# Patient Record
Sex: MALE | Race: WHITE | Employment: OTHER | URBAN - METROPOLITAN AREA
[De-identification: names, ages, dates, MRNs, and addresses within clinical notes are randomized per-mention and may not be internally consistent; named-entity substitution may affect disease eponyms.]

---

## 2021-05-03 ENCOUNTER — OFFICE VISIT (OUTPATIENT)
Dept: INTERNAL MEDICINE CLINIC | Age: 56
End: 2021-05-03
Payer: COMMERCIAL

## 2021-05-03 VITALS
DIASTOLIC BLOOD PRESSURE: 70 MMHG | HEART RATE: 85 BPM | TEMPERATURE: 98 F | BODY MASS INDEX: 29.39 KG/M2 | OXYGEN SATURATION: 99 % | WEIGHT: 217 LBS | SYSTOLIC BLOOD PRESSURE: 110 MMHG | HEIGHT: 72 IN | RESPIRATION RATE: 16 BRPM

## 2021-05-03 DIAGNOSIS — N52.2 DRUG-INDUCED ERECTILE DYSFUNCTION: ICD-10-CM

## 2021-05-03 DIAGNOSIS — Z12.5 PROSTATE CANCER SCREENING: ICD-10-CM

## 2021-05-03 DIAGNOSIS — E78.5 DYSLIPIDEMIA (HIGH LDL; LOW HDL): ICD-10-CM

## 2021-05-03 DIAGNOSIS — Z12.11 COLON CANCER SCREENING: Primary | ICD-10-CM

## 2021-05-03 DIAGNOSIS — G47.09 OTHER INSOMNIA: ICD-10-CM

## 2021-05-03 DIAGNOSIS — N20.0 RENAL STONES: ICD-10-CM

## 2021-05-03 PROCEDURE — 99203 OFFICE O/P NEW LOW 30 MIN: CPT | Performed by: INTERNAL MEDICINE

## 2021-05-03 RX ORDER — FINASTERIDE 1 MG/1
1 TABLET, FILM COATED ORAL DAILY
COMMUNITY
End: 2021-05-03 | Stop reason: SDUPTHER

## 2021-05-03 RX ORDER — ZOLPIDEM TARTRATE 6.25 MG/1
6.25 TABLET, FILM COATED, EXTENDED RELEASE ORAL
Qty: 30 TAB | Refills: 3 | Status: SHIPPED | OUTPATIENT
Start: 2021-05-03 | End: 2021-11-10

## 2021-05-03 RX ORDER — SILDENAFIL 100 MG/1
100 TABLET, FILM COATED ORAL AS NEEDED
Qty: 15 TAB | Refills: 2 | Status: SHIPPED | OUTPATIENT
Start: 2021-05-03 | End: 2021-11-10

## 2021-05-03 RX ORDER — FINASTERIDE 1 MG/1
1 TABLET, FILM COATED ORAL DAILY
Qty: 90 TAB | Refills: 1 | Status: SHIPPED | OUTPATIENT
Start: 2021-05-03 | End: 2022-08-15

## 2021-05-03 RX ORDER — POTASSIUM CITRATE 10 MEQ/1
10 TABLET, EXTENDED RELEASE ORAL DAILY
Qty: 90 TAB | Refills: 1 | Status: SHIPPED | OUTPATIENT
Start: 2021-05-03 | End: 2022-01-08

## 2021-05-03 RX ORDER — ZOLPIDEM TARTRATE 5 MG/1
TABLET ORAL
COMMUNITY
End: 2021-05-03 | Stop reason: ALTCHOICE

## 2021-05-03 RX ORDER — POTASSIUM CITRATE 10 MEQ/1
10 TABLET, EXTENDED RELEASE ORAL
COMMUNITY
End: 2021-05-03 | Stop reason: SDUPTHER

## 2021-05-03 RX ORDER — SILDENAFIL 100 MG/1
100 TABLET, FILM COATED ORAL AS NEEDED
COMMUNITY
End: 2021-05-03 | Stop reason: SDUPTHER

## 2021-05-03 NOTE — PROGRESS NOTES
1. Have you been to the ER, urgent care clinic since your last visit? Hospitalized since your last visit? No    2. Have you seen or consulted any other health care providers outside of the [address] [place] [address] since your last visit? Include any pap smears or colon screening.  No   Chief Complaint   Patient presents with   [address]

## 2021-05-04 PROBLEM — D75.1 POLYCYTHEMIA: Status: ACTIVE | Noted: 2021-05-04

## 2021-05-04 NOTE — PROGRESS NOTES
Oleg Limon is a 12-year-old white male, moved to this area in January of this year. He has a history of chronic mild hair loss and has used Propecia 1 mg for over 20 years and has prevented hair loss. He has got erectile dysfunction and takes Viagra. The erectile dysfunction may be related to the Propecia. He is due for colonoscopy. He has a history of renal stones and is on a potassium supplement to try to reduce the stone formation. He has not had a stone in more than a year, for him it is good. 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 He is generally healthy. He was getting care from the [facility] outside of [facility]. However, I have not been able to get his labs of [facility]. His blood pressure was normal.  He had regular S1, regular S2 without murmur. Abdomen soft. Neurologically, he looked well. Hair a little thin, but not bad. I went ahead and refilled the Propecia, refilled his Viagra, refilled his potassium supplement. I had asked him to get me copies of his lab work since I cannot get it off [employer] and I will order some repeat labs including a PSA screen and I have referred him for colonoscopy. Patient Active Problem List    Diagnosis    Renal stones    Drug-induced erectile dysfunction    Other insomnia     Diagnoses and all orders for this visit:    1. Colon cancer screening  -     REFERRAL TO GASTROENTEROLOGY    2. Renal stones  -     CBC WITH AUTOMATED DIFF; Future  -     LIPID PANEL; Future  -     potassium citrate (UROCIT-K10) 10 mEq (1,080 mg) TbER; Take 1 Tab by mouth daily. 3. Dyslipidemia (high LDL; low HDL)  -     METABOLIC PANEL, COMPREHENSIVE; Future  -     MAGNESIUM; Future    4. Prostate cancer screening  -     PSA SCREENING (SCREENING); Future    5. Other insomnia  -     zolpidem CR (Ambien CR) 6.25 mg tablet; Take 1 Tab by mouth nightly as needed for Sleep.  Max Daily Amount: 6.25 mg.    6. Drug-induced erectile dysfunction  -     sildenafil citrate (VIAGRA) 100 mg tablet; Take 1 Tab by mouth as needed for Erectile Dysfunction. Other orders  -     finasteride (PROPECIA) 1 mg tablet; Take 1 Tab by mouth daily.       Refill sleep ad change to time relase veronica better efficacy

## 2021-11-10 DIAGNOSIS — N52.2 DRUG-INDUCED ERECTILE DYSFUNCTION: ICD-10-CM

## 2021-11-10 DIAGNOSIS — G47.09 OTHER INSOMNIA: ICD-10-CM

## 2021-11-10 RX ORDER — SILDENAFIL 100 MG/1
TABLET, FILM COATED ORAL
Qty: 15 TABLET | Refills: 0 | Status: SHIPPED | OUTPATIENT
Start: 2021-11-10 | End: 2021-12-15

## 2021-11-10 RX ORDER — ZOLPIDEM TARTRATE 6.25 MG/1
TABLET, FILM COATED, EXTENDED RELEASE ORAL
Qty: 30 TABLET | Refills: 0 | Status: SHIPPED | OUTPATIENT
Start: 2021-11-10 | End: 2021-12-15

## 2021-12-15 DIAGNOSIS — G47.09 OTHER INSOMNIA: ICD-10-CM

## 2021-12-15 DIAGNOSIS — N52.2 DRUG-INDUCED ERECTILE DYSFUNCTION: ICD-10-CM

## 2021-12-15 RX ORDER — SILDENAFIL 100 MG/1
TABLET, FILM COATED ORAL
Qty: 15 TABLET | Refills: 0 | Status: SHIPPED | OUTPATIENT
Start: 2021-12-15 | End: 2022-01-26

## 2021-12-15 RX ORDER — ZOLPIDEM TARTRATE 6.25 MG/1
TABLET, FILM COATED, EXTENDED RELEASE ORAL
Qty: 30 TABLET | Refills: 0 | Status: SHIPPED | OUTPATIENT
Start: 2021-12-15 | End: 2022-01-26

## 2022-03-18 PROBLEM — N52.2 DRUG-INDUCED ERECTILE DYSFUNCTION: Status: ACTIVE | Noted: 2021-05-03

## 2022-03-18 PROBLEM — N20.0 RENAL STONES: Status: ACTIVE | Noted: 2021-05-03

## 2022-03-19 PROBLEM — G47.09 OTHER INSOMNIA: Status: ACTIVE | Noted: 2021-05-03

## 2022-03-19 PROBLEM — D75.1 POLYCYTHEMIA: Status: ACTIVE | Noted: 2021-05-04

## 2022-05-25 ENCOUNTER — OFFICE VISIT (OUTPATIENT)
Dept: INTERNAL MEDICINE CLINIC | Age: 57
End: 2022-05-25
Payer: COMMERCIAL

## 2022-05-25 VITALS
DIASTOLIC BLOOD PRESSURE: 88 MMHG | HEIGHT: 72 IN | WEIGHT: 215 LBS | SYSTOLIC BLOOD PRESSURE: 133 MMHG | BODY MASS INDEX: 29.12 KG/M2 | TEMPERATURE: 98.2 F | RESPIRATION RATE: 18 BRPM | OXYGEN SATURATION: 100 % | HEART RATE: 55 BPM

## 2022-05-25 DIAGNOSIS — E78.5 DYSLIPIDEMIA (HIGH LDL; LOW HDL): Primary | ICD-10-CM

## 2022-05-25 DIAGNOSIS — G47.09 OTHER INSOMNIA: ICD-10-CM

## 2022-05-25 DIAGNOSIS — D75.1 POLYCYTHEMIA: ICD-10-CM

## 2022-05-25 DIAGNOSIS — Z12.5 PROSTATE CANCER SCREENING: ICD-10-CM

## 2022-05-25 DIAGNOSIS — N52.2 DRUG-INDUCED ERECTILE DYSFUNCTION: ICD-10-CM

## 2022-05-25 PROCEDURE — 99214 OFFICE O/P EST MOD 30 MIN: CPT | Performed by: INTERNAL MEDICINE

## 2022-05-25 RX ORDER — ZOLPIDEM TARTRATE 6.25 MG/1
TABLET, FILM COATED, EXTENDED RELEASE ORAL
Qty: 30 TABLET | Refills: 1 | Status: SHIPPED | OUTPATIENT
Start: 2022-05-25 | End: 2022-10-18

## 2022-05-25 RX ORDER — SILDENAFIL 100 MG/1
TABLET, FILM COATED ORAL
Qty: 15 TABLET | Refills: 3 | Status: SHIPPED | OUTPATIENT
Start: 2022-05-25

## 2022-05-25 NOTE — PROGRESS NOTES
Chief Complaint   Patient presents with    Medication Refill       Vitals:    05/25/22 1633   BP: 133/88   Pulse: (!) 55   Resp: 18   Temp: 98.2 °F (36.8 °C)   TempSrc: Temporal   SpO2: 100%   Weight: 215 lb (97.5 kg)   Height: 6' (1.829 m)   PainSc:   0 - No pain       Health Maintenance Due   Topic    DTaP/Tdap/Td series (1 - Tdap)    Lipid Screen     Shingrix Vaccine Age 50> (1 of 2)    COVID-19 Vaccine (3 - Booster for Gunn Peter series)    Depression Screen        1. \"Have you been to the ER, urgent care clinic since your last visit? Hospitalized since your last visit? \" No    2. \"Have you seen or consulted any other health care providers outside of the [address] [place] [address] since your last visit? \" No     3. For patients aged 39-70: Has the patient had a colonoscopy / FIT/ Cologuard? Yes - no Care Gap present      If the patient is female:    4. For patients aged 41-77: Has the patient had a mammogram within the past 2 years? NA - based on age or sex      11. For patients aged 21-65: Has the patient had a pap smear?  NA - based on age or sex

## 2022-05-26 NOTE — PROGRESS NOTES
Bon Amador is a 70-year-old white male, moved to this area in January of this year. He has a history of chronic mild hair loss and has used Propecia 1 mg for over 20 years and has prevented hair loss. He has got erectile dysfunction and takes Viagra. The erectile dysfunction may be related to the Propecia. He is due for colonoscopy. He has a history of renal stones and is on a potassium supplement to try to reduce the stone formation. He has not had a stone in more than a year, for him it is good. 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 He is generally healthy. He was getting care from the [facility] outside of [address] [place] [address]. However, I have not been able to get his labs of [facility]. Muscle atrophy noted      Vitals:    05/25/22 1633   BP: 133/88   Pulse: (!) 55   Resp: 18   Temp: 98.2 °F (36.8 °C)   TempSrc: Temporal   SpO2: 100%   Weight: 215 lb (97.5 kg)   Height: 6' (1.829 m)       His blood pressure was normal.  He had regular S1, regular S2 without murmur. Abdomen soft. Neurologically, he looked well. Hair a little thin, but not bad. I went ahead and refilled the Propecia, refilled his Viagra, refilled his potassium supplement. I had asked him to get me copies of his lab work since I cannot get it off [employer] and I will order some repeat labs including a PSA screen and I have referred him for colonoscopy.     Patient Active Problem List    Diagnosis    Polycythemia     HB 18.3 in 8/20      Renal stones     BODY FLUID CELL COUNT  Component Name  8/14/2020     Clear   Appearance    CBC  Component Name  8/14/2020     9.1   6.06 (H)   18.4 (H)   54.0 (H)   89   30.4   34.1   13.1   249   White Blood Cell Count   RBC   Hemoglobin   Hematocrit   Mean Corpuscular Volume   Mean Corpus HgB   Mean Corpus HgB Conc   RBC Distribution Width   Platelet Count    CHEM PROFILE  Component Name  8/14/2020     99   14   1.30 (H)   61   70   11   141   4.7   103   25   9.3   6.8   4.5   2.3   2.0   1.2 77   17   17   Glucose, Serum   BUN   Creatinine   EGFR Nonafrican Am   EGFR African Am   BUN/Creatinine Ratio   Sodium, Serum   Potassium   Chloride, Serum   Carbon Dioxide, Total   Calcium, Serum   Protein, Total, Serum   Albumin   Globulin, Total   A/G Ratio   Bilirubin, Total   Alkaline Phosphatase, S   AST (SGOT)   ALT (SGPT)    HEPATITIS Latest Result: 8/14/2020   HEPATITIS   Component Name  8/14/2020     17   ALT (SGPT)    HORMONES  Component Name  8/14/2020     3.790   TSH    LIPID CHEMISTRY  Component Name  8/14/2020     175   174 (H)   37 (L)   35   103 (H)   Cholesterol, Total   Triglycerides   HDL Cholesterol   VLDL Cholesterol Aroldo   LDL Cholesterol Calc    LIVER PANEL  Component Name  8/14/2020     6.8   4.5   1.2   77   17   17   Protein, Total, Serum   Albumin   Bilirubin, Total   Alkaline Phosphatase, S   AST (SGOT)   ALT (SGPT)    MICRO ANTIGEN/ANTIBODY/PCRLatest Result: 8/14/2020   MICRO ANTIGEN/ANTIBODY/PCR  Component Name  8/14/2020     Comment   Microscopic Examination    THYROID TESTING  Component Name  8/14/2020     3.790   TSH    TUMOR MARKERS  Component Name  8/14/2020     0.4   Prostate Specific Antigen    Urinalysis  Component Name  8/14/2020     0.2   Urobilinogen,Semi-Qn    URINALYSISLatest Result: 8/14/2020   URINALYSIS  Component Name  8/14/2020     1.023   Yellow   Clear   Negative   Negative   Negative   Negative   Negative   Negative   Negative   Specific Gravity, Urine   Color, Urine   Appearance   WBC Esterase   Protein   Glucose, Urine, Semiquant   Ketones, Urine, Qualitative   Occult Blood, Urine   Bilirubin, Urine, Qualitative   Nitrite, Urine    WHOLE BLOOD TESTS (CHEM)  Component Name  8/14/2020     5.0   pH           Drug-induced erectile dysfunction    Other insomnia     Diagnoses and all orders for this visit:    1. Dyslipidemia (high LDL; low HDL)  -     LIPID PANEL; Future  -     METABOLIC PANEL, COMPREHENSIVE; Future    2.  Drug-induced erectile dysfunction  - sildenafil citrate (VIAGRA) 100 mg tablet; TAKE 1 TABLET BY MOUTH AS NEEDED FOR ERECTILE DYSFUNCTION  -     CBC WITH AUTOMATED DIFF; Future  -     METABOLIC PANEL, COMPREHENSIVE; Future    3. Other insomnia  -     zolpidem CR (AMBIEN CR) 6.25 mg tablet; TAKE 1 TABLET BY MOUTH NIGHTLY AS NEEDED FOR SLEEP    4. Polycythemia  -     CBC WITH AUTOMATED DIFF; Future    5. Prostate cancer screening  -     PSA SCREENING (SCREENING);  Future      Refill sleep ad change to time relase veronica better efficacy

## 2022-07-09 LAB
ALBUMIN SERPL-MCNC: 4.6 G/DL (ref 3.8–4.9)
ALBUMIN/GLOB SERPL: 1.8 {RATIO} (ref 1.2–2.2)
ALP SERPL-CCNC: 79 IU/L (ref 44–121)
ALT SERPL-CCNC: 28 IU/L (ref 0–44)
AST SERPL-CCNC: 27 IU/L (ref 0–40)
BASOPHILS # BLD AUTO: 0.1 X10E3/UL (ref 0–0.2)
BASOPHILS NFR BLD AUTO: 1 %
BILIRUB SERPL-MCNC: 1.3 MG/DL (ref 0–1.2)
BUN SERPL-MCNC: 17 MG/DL (ref 6–24)
BUN/CREAT SERPL: 13 (ref 9–20)
CALCIUM SERPL-MCNC: 9.3 MG/DL (ref 8.7–10.2)
CHLORIDE SERPL-SCNC: 100 MMOL/L (ref 96–106)
CHOLEST SERPL-MCNC: 180 MG/DL (ref 100–199)
CO2 SERPL-SCNC: 20 MMOL/L (ref 20–29)
CREAT SERPL-MCNC: 1.3 MG/DL (ref 0.76–1.27)
EGFR: 63 ML/MIN/1.73
EOSINOPHIL # BLD AUTO: 0.3 X10E3/UL (ref 0–0.4)
EOSINOPHIL NFR BLD AUTO: 4 %
ERYTHROCYTE [DISTWIDTH] IN BLOOD BY AUTOMATED COUNT: 13.5 % (ref 11.6–15.4)
GLOBULIN SER CALC-MCNC: 2.5 G/DL (ref 1.5–4.5)
GLUCOSE SERPL-MCNC: 98 MG/DL (ref 65–99)
HCT VFR BLD AUTO: 55.7 % (ref 37.5–51)
HDLC SERPL-MCNC: 43 MG/DL
HGB BLD-MCNC: 18.6 G/DL (ref 13–17.7)
IMM GRANULOCYTES # BLD AUTO: 0 X10E3/UL (ref 0–0.1)
IMM GRANULOCYTES NFR BLD AUTO: 0 %
IMP & REVIEW OF LAB RESULTS: NORMAL
LDLC SERPL CALC-MCNC: 111 MG/DL (ref 0–99)
LYMPHOCYTES # BLD AUTO: 1.6 X10E3/UL (ref 0.7–3.1)
LYMPHOCYTES NFR BLD AUTO: 21 %
MCH RBC QN AUTO: 30 PG (ref 26.6–33)
MCHC RBC AUTO-ENTMCNC: 33.4 G/DL (ref 31.5–35.7)
MCV RBC AUTO: 90 FL (ref 79–97)
MONOCYTES # BLD AUTO: 0.7 X10E3/UL (ref 0.1–0.9)
MONOCYTES NFR BLD AUTO: 9 %
NEUTROPHILS # BLD AUTO: 4.9 X10E3/UL (ref 1.4–7)
NEUTROPHILS NFR BLD AUTO: 65 %
PLATELET # BLD AUTO: 227 X10E3/UL (ref 150–450)
POTASSIUM SERPL-SCNC: 5.2 MMOL/L (ref 3.5–5.2)
PROT SERPL-MCNC: 7.1 G/DL (ref 6–8.5)
PSA SERPL-MCNC: 0.5 NG/ML (ref 0–4)
RBC # BLD AUTO: 6.19 X10E6/UL (ref 4.14–5.8)
SODIUM SERPL-SCNC: 140 MMOL/L (ref 134–144)
TRIGL SERPL-MCNC: 146 MG/DL (ref 0–149)
VLDLC SERPL CALC-MCNC: 26 MG/DL (ref 5–40)
WBC # BLD AUTO: 7.5 X10E3/UL (ref 3.4–10.8)

## 2022-08-15 RX ORDER — FINASTERIDE 1 MG/1
TABLET, FILM COATED ORAL
Qty: 90 TABLET | Refills: 0 | Status: SHIPPED | OUTPATIENT
Start: 2022-08-15

## 2022-08-22 ENCOUNTER — OFFICE VISIT (OUTPATIENT)
Dept: INTERNAL MEDICINE CLINIC | Age: 57
End: 2022-08-22
Payer: COMMERCIAL

## 2022-08-22 ENCOUNTER — NURSE TRIAGE (OUTPATIENT)
Dept: OTHER | Facility: CLINIC | Age: 57
End: 2022-08-22

## 2022-08-22 VITALS
SYSTOLIC BLOOD PRESSURE: 112 MMHG | HEIGHT: 72 IN | HEART RATE: 68 BPM | BODY MASS INDEX: 30.07 KG/M2 | RESPIRATION RATE: 18 BRPM | TEMPERATURE: 98.1 F | DIASTOLIC BLOOD PRESSURE: 74 MMHG | WEIGHT: 222 LBS | OXYGEN SATURATION: 99 %

## 2022-08-22 DIAGNOSIS — D75.1 POLYCYTHEMIA: ICD-10-CM

## 2022-08-22 DIAGNOSIS — G44.52 NEW DAILY PERSISTENT HEADACHE: Primary | ICD-10-CM

## 2022-08-22 DIAGNOSIS — G11.9 VESTIBULAR ATAXIA (HCC): ICD-10-CM

## 2022-08-22 PROCEDURE — 99213 OFFICE O/P EST LOW 20 MIN: CPT | Performed by: INTERNAL MEDICINE

## 2022-08-22 NOTE — PROGRESS NOTES
Subjective:      Patient : This patient is a 59 y.o. ..male that presents with a chief complaint of dizziness. \"Dizziness\" is described as an illusion of spinning, lghtheadedness, and imbalance*. Symptoms began rapid  when the patient was in [place]. Symptoms lasted on and off . They are improved by rest. The dizziness has been accompanied by nausea. The patient has not had a recent head injury or viral illness. The patient denies changes in prescription medications/over-the-counter medications/ herbal medications/recreational drug use/smoking/alcohol consumption. Objective:     Visit Vitals  /74   Pulse 68   Temp 98.1 °F (36.7 °C) (Temporal)   Resp 18   Ht 6' (1.829 m)   Wt 222 lb (100.7 kg)   SpO2 99%   BMI 30.11 kg/m²       Skin:  warm and normal turgor  HEENT:  KASH  Heart normal rate  Lungs: clear to auscultation and percussion throughout both lung fields  CV:normal  Abdomen  normal  Extremeties:full ROM  Neuro alert, oriented x 3, negative Romberg, and no cerebella dysfunction . [place]-Hallpike Test done and shows negative      History reviewed. No pertinent past medical history. History reviewed. No pertinent family history. Current Outpatient Medications   Medication Sig Dispense Refill    aspirin (ASPIR-81 PO) Take  by mouth. finasteride (PROPECIA) 1 mg tablet TAKE 1 TABLET BY MOUTH ONE TIME DAILY 90 Tablet 0    sildenafil citrate (VIAGRA) 100 mg tablet TAKE 1 TABLET BY MOUTH AS NEEDED FOR ERECTILE DYSFUNCTION 15 Tablet 3    zolpidem CR (AMBIEN CR) 6.25 mg tablet TAKE 1 TABLET BY MOUTH NIGHTLY AS NEEDED FOR SLEEP 30 Tablet 1    potassium citrate (UROCIT-K10) 10 mEq (1,080 mg) TbER Take 10 mEq by mouth daily.       potassium citrate (UROCIT-K10) 10 mEq (1,080 mg) TbER TAKE 1 TABLET BY MOUTH ONE TIME DAILY 90 Tablet 2     No Known Allergies  Social History     Socioeconomic History    Marital status:      Spouse name: Not on file    Number of children: Not on file    Years of education: Not on file    Highest education level: Not on file   Occupational History    Not on file   Tobacco Use    Smoking status: Never    Smokeless tobacco: Never   Vaping Use    Vaping Use: Never used   Substance and Sexual Activity    Alcohol use: Not Currently    Drug use: Never    Sexual activity: Yes     Partners: Female     Birth control/protection: None   Other Topics Concern    Not on file   Social History Narrative    Not on file     Social Determinants of Health     Financial Resource Strain: Not on file   Food Insecurity: Not on file   Transportation Needs: Not on file   Physical Activity: Not on file   Stress: Not on file   Social Connections: Not on file   Intimate Partner Violence: Not on file   Housing Stability: Not on file           Assessment:   Peripheral Vertigo  Headache daily one week    Plan:     Based on the history and physical, this patient appears to have 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 Will have the patient do the [facility] maneurver and start on meclizine. The somnonlent and drying effects of the drug have been explained to the patient. 1. New daily persistent headache  evaluate  - CT HEAD WO CONT; Future    2. Polycythemia  Lab Results   Component Value Date/Time    WBC 7.5 07/08/2022 10:13 AM    HGB 18.6 (H) 07/08/2022 10:13 AM    HCT 55.7 (H) 07/08/2022 10:13 AM    PLATELET 156 11/49/6951 10:13 AM    MCV 90 07/08/2022 10:13 AM     Likely    - REFERRAL TO HEMATOLOGY ONCOLOGY  - CBC WITH AUTOMATED DIFF; Future    3.  Vestibular ataxia (HCC)  Possible    - REFERRAL TO PHYSICAL THERAPY  evaluate

## 2022-08-22 NOTE — PROGRESS NOTES
Paradise Schmitt is a 59 y.o. male    Chief Complaint   Patient presents with    Dizziness     Intermittent x 1 wk, headaches and sometimes nausea       Visit Vitals  /74   Pulse 68   Temp 98.1 °F (36.7 °C) (Temporal)   Resp 18   Ht 6' (1.829 m)   Wt 222 lb (100.7 kg)   SpO2 99%   BMI 30.11 kg/m²       3 most recent PHQ Screens 8/22/2022   Little interest or pleasure in doing things Not at all   Feeling down, depressed, irritable, or hopeless Not at all   Total Score PHQ 2 0       No flowsheet data found. Abuse Screening Questionnaire 5/3/2021   Do you ever feel afraid of your partner? N   Are you in a relationship with someone who physically or mentally threatens you? N   Is it safe for you to go home? Y       1. Have you been to the ER, urgent care clinic since your last visit? Hospitalized since your last visit? No     2. Have you seen or consulted any other health care providers outside of the [address] [place] [address] since your last visit? Include any pap smears or colon screening.  No

## 2022-09-06 ENCOUNTER — HOSPITAL ENCOUNTER (OUTPATIENT)
Dept: CT IMAGING | Age: 57
Discharge: HOME | End: 2022-09-06
Attending: INTERNAL MEDICINE
Payer: COMMERCIAL

## 2022-09-06 DIAGNOSIS — G44.52 NEW DAILY PERSISTENT HEADACHE: ICD-10-CM

## 2022-09-06 PROCEDURE — 70450 CT HEAD/BRAIN W/O DYE: CPT

## 2022-09-12 ENCOUNTER — APPOINTMENT (OUTPATIENT)
Dept: GENERAL RADIOLOGY | Age: 57
End: 2022-09-12
Attending: STUDENT IN AN ORGANIZED HEALTH CARE EDUCATION/TRAINING PROGRAM
Payer: COMMERCIAL

## 2022-09-12 ENCOUNTER — APPOINTMENT (OUTPATIENT)
Dept: CT IMAGING | Age: 57
End: 2022-09-12
Attending: STUDENT IN AN ORGANIZED HEALTH CARE EDUCATION/TRAINING PROGRAM
Payer: COMMERCIAL

## 2022-09-12 ENCOUNTER — APPOINTMENT (OUTPATIENT)
Dept: ULTRASOUND IMAGING | Age: 57
End: 2022-09-12
Attending: FAMILY MEDICINE
Payer: COMMERCIAL

## 2022-09-12 ENCOUNTER — HOSPITAL ENCOUNTER (OUTPATIENT)
Age: 57
Setting detail: OBSERVATION
Discharge: HOME | End: 2022-09-13
Attending: STUDENT IN AN ORGANIZED HEALTH CARE EDUCATION/TRAINING PROGRAM | Admitting: FAMILY MEDICINE
Payer: COMMERCIAL

## 2022-09-12 ENCOUNTER — APPOINTMENT (OUTPATIENT)
Dept: MRI IMAGING | Age: 57
End: 2022-09-12
Attending: FAMILY MEDICINE
Payer: COMMERCIAL

## 2022-09-12 DIAGNOSIS — R71.8 ELEVATED HEMATOCRIT: ICD-10-CM

## 2022-09-12 DIAGNOSIS — R77.8 ELEVATED TROPONIN: ICD-10-CM

## 2022-09-12 DIAGNOSIS — R42 DIZZINESS: ICD-10-CM

## 2022-09-12 DIAGNOSIS — D58.2 ELEVATED HEMOGLOBIN (HCC): ICD-10-CM

## 2022-09-12 DIAGNOSIS — R29.90 STROKE-LIKE SYMPTOMS: Primary | ICD-10-CM

## 2022-09-12 DIAGNOSIS — R47.01 APHASIA: ICD-10-CM

## 2022-09-12 PROBLEM — R27.0 ATAXIA: Status: ACTIVE | Noted: 2022-09-12

## 2022-09-12 LAB
ALBUMIN SERPL-MCNC: 3.7 G/DL (ref 3.5–5)
ALBUMIN/GLOB SERPL: 1.1 {RATIO} (ref 1.1–2.2)
ALP SERPL-CCNC: 64 U/L (ref 45–117)
ALT SERPL-CCNC: 31 U/L (ref 12–78)
AMMONIA PLAS-SCNC: 24 UMOL/L
AMPHET UR QL SCN: NEGATIVE
ANION GAP SERPL CALC-SCNC: 6 MMOL/L (ref 5–15)
APPEARANCE UR: CLEAR
AST SERPL-CCNC: 29 U/L (ref 15–37)
ATRIAL RATE: 66 BPM
BACTERIA URNS QL MICRO: NEGATIVE /HPF
BARBITURATES UR QL SCN: NEGATIVE
BASOPHILS # BLD: 0 K/UL (ref 0–0.1)
BASOPHILS NFR BLD: 1 % (ref 0–1)
BENZODIAZ UR QL: NEGATIVE
BILIRUB SERPL-MCNC: 1.2 MG/DL (ref 0.2–1)
BILIRUB UR QL: NEGATIVE
BUN SERPL-MCNC: 15 MG/DL (ref 6–20)
BUN/CREAT SERPL: 11 (ref 12–20)
CALCIUM SERPL-MCNC: 9 MG/DL (ref 8.5–10.1)
CALCULATED P AXIS, ECG09: 45 DEGREES
CALCULATED R AXIS, ECG10: 78 DEGREES
CALCULATED T AXIS, ECG11: 68 DEGREES
CANNABINOIDS UR QL SCN: NEGATIVE
CHLORIDE SERPL-SCNC: 110 MMOL/L (ref 97–108)
CO2 SERPL-SCNC: 24 MMOL/L (ref 21–32)
COCAINE UR QL SCN: NEGATIVE
COLOR UR: NORMAL
COMMENT, HOLDF: NORMAL
COVID-19 RAPID TEST, COVR: NOT DETECTED
CREAT SERPL-MCNC: 1.38 MG/DL (ref 0.7–1.3)
CRP SERPL-MCNC: <0.29 MG/DL (ref 0–0.6)
DIAGNOSIS, 93000: NORMAL
DIFFERENTIAL METHOD BLD: ABNORMAL
DRUG SCRN COMMENT,DRGCM: NORMAL
EOSINOPHIL # BLD: 0.3 K/UL (ref 0–0.4)
EOSINOPHIL NFR BLD: 4 % (ref 0–7)
EPITH CASTS URNS QL MICRO: NORMAL /LPF
ERYTHROCYTE [DISTWIDTH] IN BLOOD BY AUTOMATED COUNT: 14.5 % (ref 11.5–14.5)
GLOBULIN SER CALC-MCNC: 3.4 G/DL (ref 2–4)
GLUCOSE BLD STRIP.AUTO-MCNC: 85 MG/DL (ref 65–117)
GLUCOSE SERPL-MCNC: 93 MG/DL (ref 65–100)
GLUCOSE UR STRIP.AUTO-MCNC: NEGATIVE MG/DL
HCT VFR BLD AUTO: 55.9 % (ref 36.6–50.3)
HGB BLD-MCNC: 19.4 G/DL (ref 12.1–17)
HGB UR QL STRIP: NEGATIVE
HYALINE CASTS URNS QL MICRO: NORMAL /LPF (ref 0–5)
IMM GRANULOCYTES # BLD AUTO: 0 K/UL (ref 0–0.04)
IMM GRANULOCYTES NFR BLD AUTO: 0 % (ref 0–0.5)
INR PPP: 1 (ref 0.9–1.1)
IRON SATN MFR SERPL: 32 % (ref 20–50)
IRON SERPL-MCNC: 106 UG/DL (ref 35–150)
KETONES UR QL STRIP.AUTO: NEGATIVE MG/DL
LEUKOCYTE ESTERASE UR QL STRIP.AUTO: NEGATIVE
LYMPHOCYTES # BLD: 1.5 K/UL (ref 0.8–3.5)
LYMPHOCYTES NFR BLD: 19 % (ref 12–49)
MAGNESIUM SERPL-MCNC: 2.3 MG/DL (ref 1.6–2.4)
MCH RBC QN AUTO: 31 PG (ref 26–34)
MCHC RBC AUTO-ENTMCNC: 34.7 G/DL (ref 30–36.5)
MCV RBC AUTO: 89.4 FL (ref 80–99)
METHADONE UR QL: NEGATIVE
MONOCYTES # BLD: 0.7 K/UL (ref 0–1)
MONOCYTES NFR BLD: 9 % (ref 5–13)
NEUTS SEG # BLD: 5.6 K/UL (ref 1.8–8)
NEUTS SEG NFR BLD: 67 % (ref 32–75)
NITRITE UR QL STRIP.AUTO: NEGATIVE
NRBC # BLD: 0 K/UL (ref 0–0.01)
NRBC BLD-RTO: 0 PER 100 WBC
OPIATES UR QL: NEGATIVE
P-R INTERVAL, ECG05: 168 MS
PCP UR QL: NEGATIVE
PH UR STRIP: 5 [PH] (ref 5–8)
PLATELET # BLD AUTO: 211 K/UL (ref 150–400)
PMV BLD AUTO: 9.7 FL (ref 8.9–12.9)
POTASSIUM SERPL-SCNC: 4.6 MMOL/L (ref 3.5–5.1)
PROT SERPL-MCNC: 7.1 G/DL (ref 6.4–8.2)
PROT UR STRIP-MCNC: NEGATIVE MG/DL
PROTHROMBIN TIME: 10.5 SEC (ref 9–11.1)
Q-T INTERVAL, ECG07: 374 MS
QRS DURATION, ECG06: 84 MS
QTC CALCULATION (BEZET), ECG08: 392 MS
RBC # BLD AUTO: 6.25 M/UL (ref 4.1–5.7)
RBC #/AREA URNS HPF: NORMAL /HPF (ref 0–5)
SAMPLES BEING HELD,HOLD: NORMAL
SERVICE CMNT-IMP: NORMAL
SODIUM SERPL-SCNC: 140 MMOL/L (ref 136–145)
SOURCE, COVRS: NORMAL
SP GR UR REFRACTOMETRY: >1.03
TIBC SERPL-MCNC: 333 UG/DL (ref 250–450)
TROPONIN-HIGH SENSITIVITY: 11 NG/L (ref 0–76)
UROBILINOGEN UR QL STRIP.AUTO: 0.2 EU/DL (ref 0.2–1)
VENTRICULAR RATE, ECG03: 66 BPM
WBC # BLD AUTO: 8.2 K/UL (ref 4.1–11.1)
WBC URNS QL MICRO: NORMAL /HPF (ref 0–4)

## 2022-09-12 PROCEDURE — 80307 DRUG TEST PRSMV CHEM ANLYZR: CPT

## 2022-09-12 PROCEDURE — 81001 URINALYSIS AUTO W/SCOPE: CPT

## 2022-09-12 PROCEDURE — 97165 OT EVAL LOW COMPLEX 30 MIN: CPT

## 2022-09-12 PROCEDURE — 74011250637 HC RX REV CODE- 250/637: Performed by: HOSPITALIST

## 2022-09-12 PROCEDURE — 83735 ASSAY OF MAGNESIUM: CPT

## 2022-09-12 PROCEDURE — 99285 EMERGENCY DEPT VISIT HI MDM: CPT

## 2022-09-12 PROCEDURE — G0378 HOSPITAL OBSERVATION PER HR: HCPCS

## 2022-09-12 PROCEDURE — 74011000636 HC RX REV CODE- 636: Performed by: STUDENT IN AN ORGANIZED HEALTH CARE EDUCATION/TRAINING PROGRAM

## 2022-09-12 PROCEDURE — 93005 ELECTROCARDIOGRAM TRACING: CPT

## 2022-09-12 PROCEDURE — 70496 CT ANGIOGRAPHY HEAD: CPT

## 2022-09-12 PROCEDURE — 80053 COMPREHEN METABOLIC PANEL: CPT

## 2022-09-12 PROCEDURE — 74011250637 HC RX REV CODE- 250/637: Performed by: STUDENT IN AN ORGANIZED HEALTH CARE EDUCATION/TRAINING PROGRAM

## 2022-09-12 PROCEDURE — 95706 EEG WO VID 2-12HR INTMT MNTR: CPT | Performed by: STUDENT IN AN ORGANIZED HEALTH CARE EDUCATION/TRAINING PROGRAM

## 2022-09-12 PROCEDURE — 71045 X-RAY EXAM CHEST 1 VIEW: CPT

## 2022-09-12 PROCEDURE — 87635 SARS-COV-2 COVID-19 AMP PRB: CPT

## 2022-09-12 PROCEDURE — 86140 C-REACTIVE PROTEIN: CPT

## 2022-09-12 PROCEDURE — 85025 COMPLETE CBC W/AUTO DIFF WBC: CPT

## 2022-09-12 PROCEDURE — 84484 ASSAY OF TROPONIN QUANT: CPT

## 2022-09-12 PROCEDURE — 76770 US EXAM ABDO BACK WALL COMP: CPT

## 2022-09-12 PROCEDURE — APPNB30 APP NON BILLABLE TIME 0-30 MINS: Performed by: NURSE PRACTITIONER

## 2022-09-12 PROCEDURE — 82140 ASSAY OF AMMONIA: CPT

## 2022-09-12 PROCEDURE — 85610 PROTHROMBIN TIME: CPT

## 2022-09-12 PROCEDURE — 83540 ASSAY OF IRON: CPT

## 2022-09-12 PROCEDURE — 70450 CT HEAD/BRAIN W/O DYE: CPT

## 2022-09-12 PROCEDURE — 0042T CT CODE NEURO PERF W CBF: CPT

## 2022-09-12 PROCEDURE — 96372 THER/PROPH/DIAG INJ SC/IM: CPT

## 2022-09-12 PROCEDURE — 36415 COLL VENOUS BLD VENIPUNCTURE: CPT

## 2022-09-12 PROCEDURE — 70551 MRI BRAIN STEM W/O DYE: CPT

## 2022-09-12 PROCEDURE — 74011250636 HC RX REV CODE- 250/636: Performed by: FAMILY MEDICINE

## 2022-09-12 PROCEDURE — 82962 GLUCOSE BLOOD TEST: CPT

## 2022-09-12 RX ORDER — ENOXAPARIN SODIUM 100 MG/ML
40 INJECTION SUBCUTANEOUS EVERY 24 HOURS
Status: DISCONTINUED | OUTPATIENT
Start: 2022-09-12 | End: 2022-09-13 | Stop reason: HOSPADM

## 2022-09-12 RX ORDER — ACETAMINOPHEN 325 MG/1
650 TABLET ORAL
Status: DISCONTINUED | OUTPATIENT
Start: 2022-09-12 | End: 2022-09-13 | Stop reason: HOSPADM

## 2022-09-12 RX ORDER — ZOLPIDEM TARTRATE 5 MG/1
5 TABLET ORAL
Status: DISCONTINUED | OUTPATIENT
Start: 2022-09-12 | End: 2022-09-13 | Stop reason: HOSPADM

## 2022-09-12 RX ORDER — CLOPIDOGREL 300 MG/1
300 TABLET, FILM COATED ORAL
Status: COMPLETED | OUTPATIENT
Start: 2022-09-12 | End: 2022-09-12

## 2022-09-12 RX ORDER — ACETAMINOPHEN 500 MG
TABLET ORAL AS NEEDED
COMMUNITY

## 2022-09-12 RX ORDER — ACETAMINOPHEN 650 MG/1
650 SUPPOSITORY RECTAL
Status: DISCONTINUED | OUTPATIENT
Start: 2022-09-12 | End: 2022-09-13 | Stop reason: HOSPADM

## 2022-09-12 RX ORDER — GUAIFENESIN 100 MG/5ML
81 LIQUID (ML) ORAL DAILY
Status: DISCONTINUED | OUTPATIENT
Start: 2022-09-13 | End: 2022-09-13 | Stop reason: HOSPADM

## 2022-09-12 RX ORDER — CHOLECALCIFEROL (VITAMIN D3) 125 MCG
10 CAPSULE ORAL
COMMUNITY

## 2022-09-12 RX ADMIN — IOPAMIDOL 20 ML: 755 INJECTION, SOLUTION INTRAVENOUS at 12:17

## 2022-09-12 RX ADMIN — IOPAMIDOL 100 ML: 755 INJECTION, SOLUTION INTRAVENOUS at 12:17

## 2022-09-12 RX ADMIN — CLOPIDOGREL BISULFATE 300 MG: 300 TABLET, FILM COATED ORAL at 11:57

## 2022-09-12 RX ADMIN — ENOXAPARIN SODIUM 40 MG: 100 INJECTION SUBCUTANEOUS at 19:03

## 2022-09-12 RX ADMIN — ZOLPIDEM TARTRATE 5 MG: 5 TABLET ORAL at 21:53

## 2022-09-12 NOTE — PROGRESS NOTES
Physical Therapy 9/12/2022    Orders received and chart reviewed up to date. Per OT, pt with no acute PT needs. Will complete orders. Thank you.   Sofya Castro, PT, DPT

## 2022-09-12 NOTE — PROGRESS NOTES
OCCUPATIONAL THERAPY EVALUATION/DISCHARGE  Patient: Yudelka Hopson (53 y.o. male)  Date: 9/12/2022  Primary Diagnosis: Ataxia [R27.0]       Precautions: none       ASSESSMENT  Based on the objective data described below, the patient presents with baseline ADL performance s/p admission for ataxia. Patient CT negative for acute intercranial process, MRI pending at time of evaluation. Patient BUE ROM, strength, coordination, sensation, balance and vision WFL. Neuro exam non-focal. Patient IND with bed mobility and functional mobility. Patient IND with self feeding and simulated LB dressing. Patient with no further acute OT needs, will sign off at this time. Current Level of Function (ADLs/self-care): IND    Functional Outcome Measure: The patient scored Total A-D  Total A-D (Motor Function): 66/66 on the Fugl-Flaherty Assessment which is indicative of no impairment in upper extremity functional status. Other factors to consider for discharge: none     PLAN :  Recommendation for discharge: (in order for the patient to meet his/her long term goals)  No skilled occupational therapy/ follow up rehabilitation needs identified at this time. This discharge recommendation:  Has been made in collaboration with the attending provider and/or case management    IF patient discharges home will need the following DME: none       SUBJECTIVE:   Patient stated Cheyanne Jett has to be something going on, I'm just not sure what.     OBJECTIVE DATA SUMMARY:   HISTORY:   History reviewed. No pertinent past medical history. No past surgical history on file.     Prior Level of Function/Environment/Context: IND, active, working and driving PTA  Expanded or extensive additional review of patient history:     Home Situation  Home Environment: Private residence  # Steps to Enter: 4  Rails to Enter: Yes  Hand Rails : Bilateral  One/Two Story Residence: One story  Living Alone: No  Support Systems: Spouse/Significant Other  Current DME Used/Available at Home: None  Tub or Shower Type: Shower    Hand dominance: Right    EXAMINATION OF PERFORMANCE DEFICITS:  Cognitive/Behavioral Status:  Neurologic State: Alert  Orientation Level: Oriented X4  Cognition: Appropriate for age attention/concentration; Follows commands  Perception: Appears intact  Perseveration: No perseveration noted  Safety/Judgement: Awareness of environment; Fall prevention    Skin: appears intact    Edema: none noted in BUEs    Hearing: Auditory  Auditory Impairment: None    Vision/Perceptual:    Tracking: Able to track stimulus in all quadrants w/o difficulty                 Diplopia: No    Acuity: Within Defined Limits         Range of Motion:  In BUEs  AROM: Within functional limits  PROM: Within functional limits    Strength: In BUEs  Strength: Within functional limits    Coordination:  Coordination: Within functional limits  Fine Motor Skills-Upper: Left Intact; Right Intact    Gross Motor Skills-Upper: Left Intact; Right Intact    Tone & Sensation:  In BUEs  Tone: Normal  Sensation: Intact    Balance:  Sitting: Intact  Standing: Intact    Functional Mobility and Transfers for ADLs:  Bed Mobility:  Supine to Sit: Independent  Sit to Supine: Independent    Transfers:  Sit to Stand: Independent  Stand to Sit: Independent    ADL Assessment:  Feeding: Independent    Oral Facial Hygiene/Grooming: Independent    Bathing: Independent    Upper Body Dressing: Independent    Lower Body Dressing: Independent    Toileting: Independent    ADL Intervention and task modifications:  Feeding  Feeding Assistance: Independent  Container Management: Independent  Cutting Food: Independent  Utensil Management: Independent  Food to Mouth: Independent  Drink to Mouth: Independent    Lower Body Dressing Assistance  Shoes with Cloth Laces: Independent (simulated)  Position Performed: Seated edge of bed    Cognitive Retraining  Safety/Judgement: Awareness of environment; Fall prevention    Functional Measure:  Fugl-Flaherty Assessment of Motor Recovery after Stroke:        Reflex Activity  Flexors/Biceps/Fingers: Can be elicited  Extensors/Triceps: Can be elicited  Reflex Subtotal: 4    Volitional Movement Within Synergies  Shoulder Retraction: Full  Shoulder Elevation: Full  Shoulder Abduction (90 degrees): Full  Shoulder External Rotation: Full  Elbow Flexion: Full  Forearm Supination: Full  Shoulder Adduction/Internal Rotation: Full  Elbow Extension: Full  Forearm Pronation: Full  Subtotal: 18    Volitional Movement Mixing Synergies  Hand to Lumbar Spine: Full  Shoulder Flexion (0-90 degrees): Full  Pronation-Supination: Full  Subtotal: 6    Volitional Movement With Little or No Synergy  Shoulder Abduction (0-90 degrees): Full  Shoulder Flexion ( degrees): Full  Pronation/Supination: Full  Subtotal : 6    Normal Reflex Activity  Biceps, Triceps, Finger Flexors: Full  Subtotal : 2    Upper Extremity Total   Upper Extremity Total: 36    Wrist  Stability at 15 Degree Dorsiflexion: Full  Repeated Dorsiflexion/ Volar Flexion: Full  Stability at 15 Degree Dorsiflexion: Full  Repeated Dorsiflexion/ Volar Flexion: Full  Circumduction: Full  Wrist Total: 10    Hand  Mass Flexion: Full  Mass Extension: Full  Grasp A: Full  Grasp B: Full  Grasp C: Full  Grasp D: Full  Grasp E: Full  Hand Total: 14    Coordination/Speed  Tremor: None  Dysmetria: None  Time: <1s  Coordination/Speed Total : 6    Total A-D  Total A-D (Motor Function): 66/66     This is a reliable/valid measure of arm function after a neurological event. It has established value to characterize functional status and for measuring spontaneous and therapy-induced recovery; tests proximal and distal motor functions. Fugl-Flaherty Assessment - UE scores recorded between five and 30 days post neurologic event can be used to predict UE recovery at six months post neurologic event.   Severe = 0-21 points   Moderately Severe = 22-33 points   Moderate = 34-47 points Mild = 48-66 points  TRES Camarena, SOFI Maier, & ÁLVARO Robles (1992). Measurement of motor recovery after stroke: Outcome assessment and sample size requirements. Stroke, 23, pp. 4272-5632.   ------------------------------------------------------------------------------------------------------------------------------------------------------------------  MCID:  Stroke:   Dylon Patel et al, 2001; n = 171; mean age 79 (6) years; assessed within 16 (12) days of stroke, Acute Stroke)  FMA Motor Scores from Admission to Discharge   10 point increase in FMA Upper Extremity = 1.5 change in discharge FIM   10 point increase in FMA Lower Extremity = 1.9 change in discharge FIM  MDC:   Stroke:   [place] Don et al, 2008, n = 14, mean age = 59.9 (14.6) years, assessed on average 14 (6.5) months post stroke, Chronic Stroke)   FMA = 5.2 points for the Upper Extremity portion of the assessment     Occupational Therapy Evaluation Charge Determination   History Examination Decision-Making   LOW Complexity : Brief history review  LOW Complexity : 1-3 performance deficits relating to physical, cognitive , or psychosocial skils that result in activity limitations and / or participation restrictions  LOW Complexity : No comorbidities that affect functional and no verbal or physical assistance needed to complete eval tasks       Based on the above components, the patient evaluation is determined to be of the following complexity level: LOW   Pain Rating:  Reporting no pain    Activity Tolerance: WNL    After treatment patient left in no apparent distress:    Supine in bed, Call bell within reach, and RN aware    COMMUNICATION/EDUCATION:   The patients plan of care was discussed with: Physical therapist and Registered nurse. Patient was educated regarding his deficit(s) of intermittent impaired balance as this relates to his diagnosis of ataxia with CVA workup.   He demonstrated Good understanding as evidenced by verbal responses. Patient and/or family was verbally educated on the BE FAST acronym for signs/symptoms of CVA and TIA. BE FAST was written on patient's communication board  for visual education and reinforcement. All questions answered with patient indicating good understanding.      Thank you for this referral.  Holly Dempsey OT  Time Calculation: 13 mins

## 2022-09-12 NOTE — ED TRIAGE NOTES
Triage: Pt arrives ambulatory from home with CC of intermittent episodic dizziness accompanied by confusion and lethargy. Pt reports when this happens he gets headache and forgetful. He also reports his balance is disrupted. He saw his PCP for his symptoms who performed lab tests and a head CT. His PCP told him his CT was unremarkable. Pt reports the most recent episode was on Friday and he has had the episodes for about a month now. His PCP has asked him to follow up with hematology due to personal and familial hx of a blood disorder.

## 2022-09-12 NOTE — PROGRESS NOTES
Neurocritical Care Code Stroke Documentation      Symptoms:   Patient presents to the ED with multiple symptoms to include posterior headache, blurry vision, gait instability, generalized weakness, slurred speech, fatigued, Head \"fogginess, dizziness/lightheadedness. He denies any fever. He reports he has had these symptoms on/off about 10 minutes within the last month. He reports having a headache for the past two weeks. He woke up this morning with a headache at 6:30 am and took Tylenol, which did not help. Around 8:15 am while at work he started having the rest of his symptoms described above. He reports he feels like a \"bobble head. \" He reports he saw his PCP recently for his symptoms and had labs drawn. He had a CT of Head which was negative per report. He reports he saw a Hematologist in [place] due to high Hgb, but has not been officially diagnosed with a blood disorder. He has a brother who also has the same issue, but has not been definitely diagnosed with polycythemia vera. The patient has an appointment with a Hematologist in October for further evaluation. Last Known Well: Around 8:15 am based on patient's story   Medical hx: Patient reports he has a history of kidney stones and elevated. Anticoagulation: On 81 mg of aspirin daily    VAN:   Negative   NIHSS:   1a-LOC:0    1b-Month/Age:0    1c-Open/Close Hand:0    2-Best Gaze:0    3-Visual Fields:0    4-Facial Palsy:0    5a-Left Arm:0    5b-Right Arm:0    6a-Left Le    6b-Right Le    7-Limb Ataxia:0    8-Sensory:0    9-Best Language:0    10-Dysarthria:0    11-Extinction/Inattention:0  TOTAL SCORE:1   Imaging:   CT: IMPRESSION  No acute intracranial process. CTA: IMPRESSION  No acute intracranial process. There is no major vessel occlusion. There is no hemodynamically significant stenosis, aneurysm or dissection  identified. CTP: No evidence of perfusion mismatch. No evidence of reversible  ischemia.    Plan:   TNK Candidate: NO Mechanical thrombectomy Candidate: NO, no signs of LVO clinically    Patient will be admitted for further evaluation. Recommend MRI of Brain to assess for ischemia and EEG to evaluate for seizures. Discussed with Dr. Nelida Morris. Arrival time: 1038  Time spent: 25 minutes.      Brandin Valentine NP  Neurocritical Care Nurse Practitioner

## 2022-09-12 NOTE — PROGRESS NOTES
Admission Medication Reconciliation:    Information obtained from:  spouse  RxQuery data available¹:  YES    Comments/Recommendations: Updated PTA meds/reviewed patient's allergies. 1)  Patient's current medications, supplements, and substance/alcohol use reviewed    2)  Medication changes (since last review): Added  - melatonin  - acetaminophen     Adjusted  - aspirin, dose clarified    Removed  - none      Thank you for allowing me to participate in the care of your patient. ¹RxQuery pharmacy benefit data reflects medications filled and processed through the patient's insurance, however   this data does NOT capture whether the medication was picked up or is currently being taken by the patient. Allergies:  Patient has no known allergies. Significant PMH/Disease States: History reviewed. No pertinent past medical history. Chief Complaint for this Admission:    Chief Complaint   Patient presents with    Dizziness     Prior to Admission Medications:   Prior to Admission Medications   Prescriptions Last Dose Informant Taking?   acetaminophen (TYLENOL) 500 mg tablet 9/12/2022  Yes   Sig: Take  by mouth as needed (headache). aspirin (ASPIR-81 PO)   No   Sig: Take 81 mg by mouth daily. finasteride (PROPECIA) 1 mg tablet   No   Sig: TAKE 1 TABLET BY MOUTH ONE TIME DAILY   melatonin 5 mg tablet   Yes   Sig: Take 10 mg by mouth nightly. potassium citrate (UROCIT-K10) 10 mEq (1,080 mg) TbER   No   Sig: TAKE 1 TABLET BY MOUTH ONE TIME DAILY   sildenafil citrate (VIAGRA) 100 mg tablet   No   Sig: TAKE 1 TABLET BY MOUTH AS NEEDED FOR ERECTILE DYSFUNCTION   zolpidem CR (AMBIEN CR) 6.25 mg tablet   No   Sig: TAKE 1 TABLET BY MOUTH NIGHTLY AS NEEDED FOR SLEEP      Facility-Administered Medications: None     Please contact the main inpatient pharmacy with any questions or concerns at [phone] and we will direct you to the clinical pharmacist covering this patient's care while in-house.    Mani Almonte PHARMD

## 2022-09-12 NOTE — ED NOTES
TRANSFER - OUT REPORT:    Verbal report given to Omkar Maldonado RN(name) on Yudelka Hopson  being transferred to [facility](unit) for routine progression of care       Report consisted of patients Situation, Background, Assessment and   Recommendations(SBAR). Information from the following report(s) SBAR, ED Summary, MAR, and Recent Results was reviewed with the receiving nurse. Lines:   Peripheral IV 09/12/22 Left Arm (Active)   Site Assessment Clean, dry, & intact 09/12/22 1113   Phlebitis Assessment 0 09/12/22 1113   Infiltration Assessment 0 09/12/22 1113   Dressing Status Clean, dry, & intact 09/12/22 1113   Dressing Type Trach dressing 09/12/22 1113   Hub Color/Line Status Pink 09/12/22 1113   Action Taken Blood drawn 09/12/22 1113   Alcohol Cap Used No 09/12/22 1113        Opportunity for questions and clarification was provided.       Patient transported with:   [employer]

## 2022-09-12 NOTE — H&P
[address] [facility] Adult  Hospitalist Group  History and Physical    Date of Service:  9/12/2022  Primary Care Provider: Katelyn Bundy MD  Source of information: The patient and Chart review    Chief Complaint: Dizziness      History of Presenting Illness:   Yuko Pandey is a 59 y.o. male who presents with  slurred speech, dizziness and feeling off balance. Patient reports that he was well this morning and then around 8AM while at work he started having some difficulty with speech, finding his words, slurred speech and feeling as if he was off balance. Patient reports still having \"some head fog and feeling off balance. \"  He does report having improvement of of his brain fog but reports still feeling off balance. He denies any vision changes, denies any weakness or numbness of her upper extremities. Patient was told recently that he had elevated hemoglobin hematocrit levels and he does report having a family history of hypercoagulability disorder. Patient does not smoke. Denies any history of high blood pressure high cholesterol or diabetes. Denies history of stroke    The patient denies any headache, blurry vision, sore throat, trouble swallowing, trouble with speech, chest pain, SOB, cough, fever, chills, N/V/D, abd pain, urinary symptoms, constipation, recent travels, sick contacts, focal or generalized neurological symptoms, falls, injuries, rashes, contact with COVID-19 diagnosed patients, hematemesis, melena, hemoptysis, hematuria, rashes, denies starting any new medications and denies any other concerns or problems besides as mentioned above. REVIEW OF SYSTEMS:  A comprehensive review of systems was negative except for that written in the History of Present Illness.      Past medical history-chronic kidney disease stage III, erectile dysfunction, alopecia, insomnia, kidney stones, hyperlipidemia  Surgical history-appendectomy    Prior to Admission medications    Medication Sig Start Date End Date Taking? Authorizing Provider   acetaminophen (TYLENOL) 500 mg tablet Take  by mouth as needed (headache). Yes Provider, Historical   melatonin 5 mg tablet Take 10 mg by mouth nightly. Yes Provider, Historical   aspirin (ASPIR-81 PO) Take 81 mg by mouth daily. Provider, Historical   finasteride (PROPECIA) 1 mg tablet TAKE 1 TABLET BY MOUTH ONE TIME DAILY 8/15/22   James Majano MD   sildenafil citrate (VIAGRA) 100 mg tablet TAKE 1 TABLET BY MOUTH AS NEEDED FOR ERECTILE DYSFUNCTION 5/25/22   James Majano MD   zolpidem CR (AMBIEN CR) 6.25 mg tablet TAKE 1 TABLET BY MOUTH NIGHTLY AS NEEDED FOR SLEEP 5/25/22   James Majano MD   potassium citrate (UROCIT-K10) 10 mEq (1,080 mg) TbER TAKE 1 TABLET BY MOUTH ONE TIME DAILY 1/8/22   James Majano MD     No Known Allergies   Family history his mother passed with a history of blood clots and probable heart disease requiring cardiac cath and stent, as well as a blood disorder unspecified  -His fathers still living and healthy he has brothers and sisters with elevated hemoglobins and suspected blood disorder    Social History:  reports that he has never smoked. He has never used smokeless tobacco. He reports that he does not currently use alcohol. He reports that he does not use drugs. He is  with 4 children;   Works in food marketing industry    Objective:   Visit Vitals  BP (!) 138/92   Pulse (!) 57   Temp 98.4 °F (36.9 °C)   Resp 19   Wt 99.2 kg (218 lb 11.1 oz)   SpO2 96%   BMI 29.66 kg/m²      O2 Device: None (Room air)    Patient Vitals for the past 24 hrs:   Temp Pulse Resp BP SpO2   09/12/22 1400 -- (!) 57 19 (!) 138/92 --   09/12/22 1330 -- 60 17 (!) 126/96 --   09/12/22 1151 -- 62 24 (!) 126/96 96 %   09/12/22 1130 -- (!) 59 21 125/89 --   09/12/22 0954 98.4 °F (36.9 °C) 97 16 113/80 97 %      PHYSICAL EXAM:   General: Alert x oriented x 3, awake, no acute distress, resting in bed, pleasant male, appears to be stated age  [de-identified]: PEERL, EOMI, moist mucus membranes  Neck: Supple, no JVD, no meningeal signs  Chest: Clear to auscultation bilaterally   CVS: RRR, S1 S2 heard, no murmurs/rubs/gallops  Abd: Soft, non-tender, non-distended, +bowel sounds   Ext: No clubbing, no cyanosis, no edema  Neuro/Psych: Pleasant mood and affect, CN 2-12 grossly intact, sensory grossly within normal limit, Strength 5/5 in all extremities,   Pulses: 2+, symmetric in all extremities  Skin: Warm, dry, without rashes or lesions    Data Review: All diagnostic labs and studies have been reviewed. Recent Results (from the past 24 hour(s))   EKG, 12 LEAD, INITIAL    Collection Time: 09/12/22 10:07 AM   Result Value Ref Range    Ventricular Rate 66 BPM    Atrial Rate 66 BPM    P-R Interval 168 ms    QRS Duration 84 ms    Q-T Interval 374 ms    QTC Calculation (Bezet) 392 ms    Calculated P Axis 45 degrees    Calculated R Axis 78 degrees    Calculated T Axis 68 degrees    Diagnosis       Normal sinus rhythm  Normal ECG  No previous ECGs available  Confirmed by Claudell Stalling, MD ([id number]) on 9/12/2022 11:48:01 AM     SAMPLES BEING HELD    Collection Time: 09/12/22 10:20 AM   Result Value Ref Range    SAMPLES BEING HELD 1RED     COMMENT        Add-on orders for these samples will be processed based on acceptable specimen integrity and analyte stability, which may vary by analyte.    METABOLIC PANEL, COMPREHENSIVE    Collection Time: 09/12/22 10:20 AM   Result Value Ref Range    Sodium 140 136 - 145 mmol/L    Potassium 4.6 3.5 - 5.1 mmol/L    Chloride 110 (H) 97 - 108 mmol/L    CO2 24 21 - 32 mmol/L    Anion gap 6 5 - 15 mmol/L    Glucose 93 65 - 100 mg/dL    BUN 15 6 - 20 MG/DL    Creatinine 1.38 (H) 0.70 - 1.30 MG/DL    BUN/Creatinine ratio 11 (L) 12 - 20      GFR est AA >60 >60 ml/min/1.73m2    GFR est non-AA 53 (L) >60 ml/min/1.73m2    Calcium 9.0 8.5 - 10.1 MG/DL    Bilirubin, total 1.2 (H) 0.2 - 1.0 MG/DL    ALT (SGPT) 31 12 - 78 U/L    AST (SGOT) 29 15 - 37 U/L    Alk. phosphatase 64 45 - 117 U/L    Protein, total 7.1 6.4 - 8.2 g/dL    Albumin 3.7 3.5 - 5.0 g/dL    Globulin 3.4 2.0 - 4.0 g/dL    A-G Ratio 1.1 1.1 - 2.2     TROPONIN-HIGH SENSITIVITY    Collection Time: 09/12/22 10:20 AM   Result Value Ref Range    Troponin-High Sensitivity 11 0 - 76 ng/L   MAGNESIUM    Collection Time: 09/12/22 10:20 AM   Result Value Ref Range    Magnesium 2.3 1.6 - 2.4 mg/dL   C REACTIVE PROTEIN, QT    Collection Time: 09/12/22 10:20 AM   Result Value Ref Range    C-Reactive protein <0.29 0.00 - 0.60 mg/dL   GLUCOSE, POC    Collection Time: 09/12/22 10:40 AM   Result Value Ref Range    Glucose (POC) 85 65 - 117 mg/dL    Performed by [facility] + INR    Collection Time: 09/12/22 11:14 AM   Result Value Ref Range    INR 1.0 0.9 - 1.1      Prothrombin time 10.5 9.0 - 11.1 sec   CBC WITH AUTOMATED DIFF    Collection Time: 09/12/22 11:14 AM   Result Value Ref Range    WBC 8.2 4.1 - 11.1 K/uL    RBC 6.25 (H) 4.10 - 5.70 M/uL    HGB 19.4 (H) 12.1 - 17.0 g/dL    HCT 55.9 (H) 36.6 - 50.3 %    MCV 89.4 80.0 - 99.0 FL    MCH 31.0 26.0 - 34.0 PG    MCHC 34.7 30.0 - 36.5 g/dL    RDW 14.5 11.5 - 14.5 %    PLATELET 585 598 - 471 K/uL    MPV 9.7 8.9 - 12.9 FL    NRBC 0.0 0  WBC    ABSOLUTE NRBC 0.00 0.00 - 0.01 K/uL    NEUTROPHILS 67 32 - 75 %    LYMPHOCYTES 19 12 - 49 %    MONOCYTES 9 5 - 13 %    EOSINOPHILS 4 0 - 7 %    BASOPHILS 1 0 - 1 %    IMMATURE GRANULOCYTES 0 0.0 - 0.5 %    ABS. NEUTROPHILS 5.6 1.8 - 8.0 K/UL    ABS. LYMPHOCYTES 1.5 0.8 - 3.5 K/UL    ABS. MONOCYTES 0.7 0.0 - 1.0 K/UL    ABS. EOSINOPHILS 0.3 0.0 - 0.4 K/UL    ABS. BASOPHILS 0.0 0.0 - 0.1 K/UL    ABS. IMM.  GRANS. 0.0 0.00 - 0.04 K/UL    DF AUTOMATED     COVID-19 RAPID TEST    Collection Time: 09/12/22  2:28 PM   Result Value Ref Range    Specimen source Nasopharyngeal      COVID-19 rapid test Not detected NOTD     URINALYSIS W/MICROSCOPIC    Collection Time: 09/12/22  2:29 PM   Result Value Ref Range    Color YELLOW/STRAW      Appearance CLEAR CLEAR      Specific gravity >1.030     pH (UA) 5.0 5.0 - 8.0      Protein Negative NEG mg/dL    Glucose Negative NEG mg/dL    Ketone Negative NEG mg/dL    Bilirubin Negative NEG      Blood Negative NEG      Urobilinogen 0.2 0.2 - 1.0 EU/dL    Nitrites Negative NEG      Leukocyte Esterase Negative NEG      WBC 0-4 0 - 4 /hpf    RBC 0-5 0 - 5 /hpf    Epithelial cells FEW FEW /lpf    Bacteria Negative NEG /hpf    Hyaline cast 0-2 0 - 5 /lpf   DRUG SCREEN, URINE    Collection Time: 09/12/22  2:29 PM   Result Value Ref Range    AMPHETAMINES Negative NEG      BARBITURATES Negative NEG      BENZODIAZEPINES Negative NEG      COCAINE Negative NEG      METHADONE Negative NEG      OPIATES Negative NEG      PCP(PHENCYCLIDINE) Negative NEG      THC (TH-CANNABINOL) Negative NEG      Drug screen comment (NOTE)    AMMONIA    Collection Time: 09/12/22  2:29 PM   Result Value Ref Range    Ammonia, plasma 24 <32 UMOL/L          All Micro Results       Procedure Component Value Units Date/Time    COVID-19 RAPID TEST [[id number]] Collected: 09/12/22 1428    Order Status: Completed Specimen: Nasopharyngeal Updated: 09/12/22 1452     Specimen source Nasopharyngeal        COVID-19 rapid test Not detected        Comment: Rapid Abbott ID Now       Rapid NAAT:  The specimen is NEGATIVE for SARS-CoV-2, the novel coronavirus associated with COVID-19. Negative results should be treated as presumptive and, if inconsistent with clinical signs and symptoms or necessary for patient management, should be tested with an alternative molecular assay. Negative results do not preclude SARS-CoV-2 infection and should not be used as the sole basis for patient management decisions. This test has been authorized by the FDA under an Emergency Use Authorization (EUA) for use by authorized laboratories.    Fact sheet for Healthcare Providers: ConventionUpdate.co.nz  Fact sheet for Patients: [facility]       Methodology: Isothermal Nucleic Acid Amplification                 IMAGING:   CTA CODE NEURO HEAD AND NECK W CONT   Final Result   No acute intracranial process. There is no major vessel occlusion. There is no hemodynamically significant stenosis, aneurysm or dissection   identified. .             CT CODE NEURO PERF W CBF   Final Result   No acute intracranial process. There is no major vessel occlusion. There is no hemodynamically significant stenosis, aneurysm or dissection   identified. .             XR CHEST PORT   Final Result   Normal chest.       CT CODE NEURO HEAD WO CONTRAST   Final Result      No acute process. MRI BRAIN WO CONT    (Results Pending)   US RETROPERITONEUM COMP    (Results Pending)        ECG/ECHO:    Results for orders placed or performed during the hospital encounter of 09/12/22   EKG, 12 LEAD, INITIAL   Result Value Ref Range    Ventricular Rate 66 BPM    Atrial Rate 66 BPM    P-R Interval 168 ms    QRS Duration 84 ms    Q-T Interval 374 ms    QTC Calculation (Bezet) 392 ms    Calculated P Axis 45 degrees    Calculated R Axis 78 degrees    Calculated T Axis 68 degrees    Diagnosis       Normal sinus rhythm  Normal ECG  No previous ECGs available  Confirmed by Drew Trejo MD ([id number]) on 9/12/2022 11:48:01 AM          Assessment:   Given the patient's current clinical presentation, there is a high level of concern for decompensation if discharged from the emergency department. Complex decision making was performed, which includes reviewing the patient's available past medical records, laboratory results, and imaging studies.     Active Problems:    Ataxia (9/12/2022)      Plan:     1) dizziness ataxia slurred speech-recurrent episodes-admit for neurological evaluation and stroke work-up  CTAs and CT of the head were unremarkable-brain MRI ordered stroke order set used  Neurology to see patient-patient loaded with 300 of Plavix in the emergency room resume 81 mg aspirin in a.m. tomorrow  OT PT eval hold off on speech eval for now we will consider carotid Dopplers and echocardiogram depending on brain MRI result  Patient currently on continuous EEG monitor in the emergency room for evaluation for possible seizures-check magnesium level check ammonia level-check urine drug screen  2) history of lipidemia but never started on medications-recheck fasting lipid panel in a.m. tomorrow  3) elevated creatinine most consistent with chronic kidney disease stage III-3 of renal stones-retroperitoneal ultrasound for further evaluation  Holding off on IV fluids due to suspected chronicity of issue-resume patient's home med potassium citrate which she takes for renal stones on discharge  Check urinalysis for proteins and/or blood  4) alopecia-restart finasteride on discharge  5) insomnia-resume Ambien and melatonin on discharge  6) elevated hematocrit and hemoglobin-suspicious for polycythemia-consult hematology for evaluation, check iron levels    Full code      DIET: ADULT DIET Regular   ISOLATION PRECAUTIONS: There are currently no Active Isolations  CODE STATUS: Full Code   DVT PROPHYLAXIS: Lovenox  FUNCTIONAL STATUS PRIOR TO HOSPITALIZATION: Fully active and ambulatory; able to carry on all self-care without restriction. EARLY MOBILITY ASSESSMENT: Recommend an assessment from physical therapy and/or occupational therapy  ANTICIPATED DISCHARGE: 24-48 hours.   EMERGENCY CONTACT/SURROGATE DECISION MAKER: His wife with name and phone number in the patient's demographics      Signed By: Tucker Paredes MD     September 12, 2022

## 2022-09-12 NOTE — ED PROVIDER NOTES
This is a 75-year-old male presents the ED for evaluation of slurred speech, dizziness and feeling off balance. Patient reports that he was well this morning and then around 8AM while at work he started having some difficulty with speech, finding his words, slurred speech and feeling as if he was off balance. Patient reports still having \"some head fog and feeling off balance. \"  He does report having improvement of of his brain fog but reports still feeling off balance. He denies any vision changes, denies any weakness or numbness of her upper extremities. Patient was told recently that he had elevated hemoglobin hematocrit levels and he does report having a family history of hypercoagulability disorder. Patient does not smoke. Denies any history of high blood pressure high cholesterol or diabetes. Denies history of stroke      Dizziness  Primary symptoms include speech difficulty. Associated symptoms include headaches. Pertinent negatives include no shortness of breath and no chest pain. History reviewed. No pertinent past medical history. No past surgical history on file. History reviewed. No pertinent family history.     Social History     Socioeconomic History    Marital status:      Spouse name: Not on file    Number of children: Not on file    Years of education: Not on file    Highest education level: Not on file   Occupational History    Not on file   Tobacco Use    Smoking status: Never    Smokeless tobacco: Never   Vaping Use    Vaping Use: Never used   Substance and Sexual Activity    Alcohol use: Not Currently    Drug use: Never    Sexual activity: Yes     Partners: Female     Birth control/protection: None   Other Topics Concern    Not on file   Social History Narrative    Not on file     Social Determinants of Health     Financial Resource Strain: Not on file   Food Insecurity: Not on file   Transportation Needs: Not on file   Physical Activity: Not on file   Stress: Not on file   Social Connections: Not on file   Intimate Partner Violence: Not on file   Housing Stability: Not on file         ALLERGIES: Patient has no known allergies. Review of Systems   Constitutional:  Positive for fever. Respiratory:  Negative for shortness of breath. Cardiovascular:  Negative for chest pain. Gastrointestinal:  Negative for abdominal pain. Musculoskeletal:  Negative for neck pain. Skin:  Negative for wound. Neurological:  Positive for dizziness, speech difficulty and headaches. All other systems reviewed and are negative. Vitals:    09/12/22 0954 09/12/22 1039   BP: 113/80    Pulse: 97    Resp: 16    Temp: 98.4 °F (36.9 °C)    SpO2: 97%    Weight:  99.2 kg (218 lb 11.1 oz)            Physical Exam  Vitals and nursing note reviewed. Constitutional:       General: He is not in acute distress. Appearance: He is normal weight. He is not toxic-appearing. HENT:      Head: Normocephalic and atraumatic. Right Ear: External ear normal.      Left Ear: External ear normal.      Nose: Nose normal.      Mouth/Throat:      Mouth: Mucous membranes are moist.   Eyes:      General: No visual field deficit. Extraocular Movements: Extraocular movements intact. Conjunctiva/sclera: Conjunctivae normal.      Pupils: Pupils are equal, round, and reactive to light. Cardiovascular:      Rate and Rhythm: Normal rate and regular rhythm. Pulses: Normal pulses. Heart sounds: Normal heart sounds. Pulmonary:      Effort: Pulmonary effort is normal.      Breath sounds: Normal breath sounds. Abdominal:      General: Abdomen is flat. Bowel sounds are normal.      Palpations: Abdomen is soft. Musculoskeletal:         General: Normal range of motion. Cervical back: Normal range of motion and neck supple. Skin:     General: Skin is warm. Capillary Refill: Capillary refill takes less than 2 seconds. Neurological:      General: No focal deficit present. Mental Status: He is alert. Cranial Nerves: Cranial nerves are intact. No cranial nerve deficit. Sensory: Sensation is intact. No sensory deficit. Motor: Motor function is intact. No weakness. Coordination: Coordination is intact. Gait: Gait is intact. Psychiatric:         Mood and Affect: Mood normal.        MDM  Number of Diagnoses or Management Options  Aphasia  Dizziness  Elevated hematocrit  Elevated hemoglobin (HCC)  Elevated troponin  Stroke-like symptoms  Diagnosis management comments: Differential diagnosis includes but limited to posterior stroke, acute coronary syndrome, dehydration, ACS. Given patient's presentation, symptoms started and in a.m. patient was still within the 4 and half hour window for thrombolytics and was activated as a stroke alert. NIH of zero  ED Course as of 09/12/22 1717   Mon Sep 12, 2022   1105 I spoke to teleneurology regarding the patient he states that we should admit the patient for further work-up. He recommended with his history of hypercoagulability that we should possibly check a Maury 2 for polycythemia vera. Patient has an appointment with his hematologist in October. Additionally recommended Plavix 300 mg loading dose and EEG. In addition to further imaging. [WG]   0498 CTA head and neck show no major vessel occlusion, there is no hemodynamically significant stenosis, aneurysm or dissection identified. CT head without shows no acute process. CT neuro perfusion study shows no acute intracranial process, no major vessel occlusion, no hemodynamically significant stenosis aneurysm or dissection. Chest x-ray is normal.    CBC shows elevated hemoglobin hematocrit levels, serum creatinine 1.38 which appears about baseline. High since her troponin of 11. Requires further admission evaluation for strokelike symptoms and further work-up.  [WG]      ED Course User Index  [WG] Dub Argue, DO       Procedures      Perfect Serve Consult for Admission  1:26 PM    ED Room Number: ER07/07  Patient Name and age:  Mae Wilson 59 y.o.  male  Working Diagnosis:   1. Stroke-like symptoms    2. Dizziness    3. Aphasia    4. Elevated hemoglobin (HCC)    5. Elevated hematocrit    6. Elevated troponin        COVID-19 Suspicion:  no  Sepsis present:  no  Reassessment needed: no  Code Status:  Full Code  Readmission: no  Isolation Requirements:  no  Recommended Level of Care:  med/surg  Department:[facility] ED - 21   Other:  43-year-old male brought in for evaluation of dizziness, aphasia and ataxia. Spoke to teleneurology who is recommended further admission and evaluation. Recommended Plavix 300 loading dose. He also recommended work-up for polycythemia vera, with a JAK2 lab, patient has possible family history of polycythemia vera, has not had a hematologist visit. Patient's symptoms have improved. Have ordered EEG as well.

## 2022-09-13 VITALS
DIASTOLIC BLOOD PRESSURE: 84 MMHG | HEART RATE: 94 BPM | SYSTOLIC BLOOD PRESSURE: 119 MMHG | WEIGHT: 218.7 LBS | BODY MASS INDEX: 29.66 KG/M2 | OXYGEN SATURATION: 97 % | RESPIRATION RATE: 15 BRPM | TEMPERATURE: 97.7 F

## 2022-09-13 DIAGNOSIS — R41.89 SPELL OF ALTERED COGNITION: Primary | ICD-10-CM

## 2022-09-13 LAB
ANION GAP SERPL CALC-SCNC: 11 MMOL/L (ref 5–15)
BUN SERPL-MCNC: 15 MG/DL (ref 6–20)
BUN/CREAT SERPL: 13 (ref 12–20)
CALCIUM SERPL-MCNC: 8.7 MG/DL (ref 8.5–10.1)
CHLORIDE SERPL-SCNC: 107 MMOL/L (ref 97–108)
CHOLEST SERPL-MCNC: 154 MG/DL
CO2 SERPL-SCNC: 23 MMOL/L (ref 21–32)
CREAT SERPL-MCNC: 1.19 MG/DL (ref 0.7–1.3)
ERYTHROCYTE [SEDIMENTATION RATE] IN BLOOD: 2 MM/HR (ref 0–20)
EST. AVERAGE GLUCOSE BLD GHB EST-MCNC: 105 MG/DL
GLUCOSE SERPL-MCNC: 102 MG/DL (ref 65–100)
HBA1C MFR BLD: 5.3 % (ref 4–5.6)
HDLC SERPL-MCNC: 34 MG/DL
HDLC SERPL: 4.5 {RATIO} (ref 0–5)
LDLC SERPL CALC-MCNC: 75.8 MG/DL (ref 0–100)
POTASSIUM SERPL-SCNC: 4.3 MMOL/L (ref 3.5–5.1)
SODIUM SERPL-SCNC: 141 MMOL/L (ref 136–145)
TRIGL SERPL-MCNC: 221 MG/DL (ref ?–150)
VLDLC SERPL CALC-MCNC: 44.2 MG/DL

## 2022-09-13 PROCEDURE — 36415 COLL VENOUS BLD VENIPUNCTURE: CPT

## 2022-09-13 PROCEDURE — G0378 HOSPITAL OBSERVATION PER HR: HCPCS

## 2022-09-13 PROCEDURE — 81270 JAK2 GENE: CPT

## 2022-09-13 PROCEDURE — 99205 OFFICE O/P NEW HI 60 MIN: CPT | Performed by: PSYCHIATRY & NEUROLOGY

## 2022-09-13 PROCEDURE — 85652 RBC SED RATE AUTOMATED: CPT

## 2022-09-13 PROCEDURE — 81279 JAK2 GENE TRGT SEQUENCE ALYS: CPT

## 2022-09-13 PROCEDURE — 82668 ASSAY OF ERYTHROPOIETIN: CPT

## 2022-09-13 PROCEDURE — 81338 MPL GENE COMMON VARIANTS: CPT

## 2022-09-13 PROCEDURE — 74011250637 HC RX REV CODE- 250/637: Performed by: FAMILY MEDICINE

## 2022-09-13 PROCEDURE — 95717 EEG PHYS/QHP 2-12 HR W/O VID: CPT | Performed by: PSYCHIATRY & NEUROLOGY

## 2022-09-13 PROCEDURE — 80061 LIPID PANEL: CPT

## 2022-09-13 PROCEDURE — 80048 BASIC METABOLIC PNL TOTAL CA: CPT

## 2022-09-13 PROCEDURE — 81219 CALR GENE COM VARIANTS: CPT

## 2022-09-13 PROCEDURE — 83036 HEMOGLOBIN GLYCOSYLATED A1C: CPT

## 2022-09-13 PROCEDURE — 81256 HFE GENE: CPT

## 2022-09-13 RX ORDER — METHYLPREDNISOLONE 4 MG/1
TABLET ORAL
Qty: 1 DOSE PACK | Refills: 0 | Status: SHIPPED | OUTPATIENT
Start: 2022-09-13

## 2022-09-13 RX ADMIN — ASPIRIN 81 MG CHEWABLE TABLET 81 MG: 81 TABLET CHEWABLE at 09:55

## 2022-09-13 RX ADMIN — ACETAMINOPHEN 650 MG: 325 TABLET, FILM COATED ORAL at 11:12

## 2022-09-13 NOTE — CONSULTS
Neurology Consult Note     NAME: Marion Doe   :  1965   MRN:  [id number]   DATE:  2022       HPI:  Pt is a 61yo male who presented 22 with dysarthria, dizziness, and imbalance, onset 8A while at work. Patient reports onset of symptoms 3 weeks ago. He started getting regular headaches daily at times more severe than others and [facility] 22 ordered by PCP was neg. patient reports longstanding history of migraine with visual aura, but has not had that in about 10 years. He does have intermittent headaches without  N/V/P/P and then may not have headaches for several months. During this 3-week period, he has had episodes in which he just feels \"off\", feels like he is \"bobble headed\" and off balance when walking, though can have the spells while sitting as well. No N/V/Diplopia/hiccups/N/T/W. No spinning dizziness. They last a couple of hours. 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 He denies being anxious during the talk. He asked members of the audience if he was talking normally or sounded normal and they did not notice any unusual behavior during the 45-minute lesson. The one he had yesterday was the worst, his words were slurred. He did not feel safe to drive and called his wife who brought him to the emergency department. In the ED he had a Ceribell EEG done which is reviewed and normal.  He denies prior history of seizure, no family history of seizure, no history of head injury, no history of CNS infection, no history of febrile seizure. [facility] 22 also neg. CTA H/N -No LVO, no significant stenosis.  MRI brain is neg. CMP with Cr 1.38, TB 1.2. CBC with diff - Hgb 19.4. HgbA1C 5.3. LDL 75.8. ESR, Ammonia, UDS, COVID -19, UA - normal.     PMH:  HLD  CKD  Kidney stones  Insomnia  Alopecia  Appe      ROS:  Per HPI o/w neg. MEDS:  Home:  Tylenol  Melatonin  Aspirin 81 mg a day  Propecia  Viagra  Ambien  Potassium      Current Facility-Administered Medications:     acetaminophen (TYLENOL) tablet 650 mg, 650 mg, Oral, Q4H PRN, 650 mg at 22 1112 **OR** acetaminophen (TYLENOL) solution 650 mg, 650 mg, Per NG tube, Q4H PRN **OR** acetaminophen (TYLENOL) suppository 650 mg, 650 mg, Rectal, Q4H PRN, Shaina Gracia MD    aspirin chewable tablet 81 mg, 81 mg, Oral, DAILY, Shaina Gracia MD, 81 mg at 22 0955    enoxaparin (LOVENOX) injection 40 mg, 40 mg, SubCUTAneous, Q24H, Shaina Gracia MD, 40 mg at 22 1903    zolpidem (AMBIEN) tablet 5 mg, 5 mg, Oral, QHS PRN, Din, Frank Britton MD, 5 mg at 22 2153      No Known Allergies      SH:  No T/E/D  , works in food marketing  4 children    FH:  M-h/o blood clots, heart disease  F - healthy  B and sisters with elevated hemoglobins    PHYSICAL EXAM:    Visit Vitals  /84 (BP 1 Location: Right upper arm, BP Patient Position: Sitting)   Pulse 94   Temp 97.7 °F (36.5 °C)   Resp 18   Wt 218 lb 11.1 oz (99.2 kg)   SpO2 97%   BMI 29.66 kg/m²     Temp (24hrs), Av.1 °F (36.7 °C), Min:97.7 °F (36.5 °C), Max:98.3 °F (36.8 °C)        General: Well developed well nourished patient in no apparent distress. Cardiac: Regular rate and rhythm with no murmurs. Neck: 2+ carotids, no bruits  Extremities: 2+ Radial pulses, no cyanosis or edema    Neurological Exam:  Mental Status: Oriented to time, place and person. Speech and language intact. Attention and fund of knowledge appropriate. Normal recent and remote memory. Cranial Nerves:   VFF, PERRL, EOMI, no nystagmus, no diplopia, no ptosis. Facial sensation is normal. Facial movement is symmetric.   Palate is midline. Tongue is midline. Hearing is intact bilaterally. Trap/SCM 5/5   Motor:  5/5 strength in upper and lower proximal and distal muscles. Normal bulk and tone. No PD. No tremors   Reflexes:   Deep tendon reflexes 2+ and symmetric. Toes downgoing. Sensory:   Intact to LT and PP   Gait:  Steady routine gait   Cerebellar:  Intact FTN and HTS, MARGARETH intact         STUDIES AND REPORTS:  Recent Results (from the past 24 hour(s))   SED RATE (ESR)    Collection Time: 09/13/22 12:40 AM   Result Value Ref Range    Sed rate, automated 2 0 - 20 mm/hr   LIPID PANEL    Collection Time: 09/13/22 12:40 AM   Result Value Ref Range    Cholesterol, total 154 <200 MG/DL    Triglyceride 221 (H) <150 MG/DL    HDL Cholesterol 34 MG/DL    LDL, calculated 75.8 0 - 100 MG/DL    VLDL, calculated 44.2 MG/DL    CHOL/HDL Ratio 4.5 0.0 - 5.0     HEMOGLOBIN A1C WITH EAG    Collection Time: 09/13/22 12:40 AM   Result Value Ref Range    Hemoglobin A1c 5.3 4.0 - 5.6 %    Est. average glucose 251 mg/dL   METABOLIC PANEL, BASIC    Collection Time: 09/13/22 12:40 AM   Result Value Ref Range    Sodium 141 136 - 145 mmol/L    Potassium 4.3 3.5 - 5.1 mmol/L    Chloride 107 97 - 108 mmol/L    CO2 23 21 - 32 mmol/L    Anion gap 11 5 - 15 mmol/L    Glucose 102 (H) 65 - 100 mg/dL    BUN 15 6 - 20 MG/DL    Creatinine 1.19 0.70 - 1.30 MG/DL    BUN/Creatinine ratio 13 12 - 20      GFR est AA >60 >60 ml/min/1.73m2    GFR est non-AA >60 >60 ml/min/1.73m2    Calcium 8.7 8.5 - 10.1 MG/DL     MRI Results (most recent):  Results from Hospital Encounter encounter on 09/12/22    MRI BRAIN WO CONT    Narrative  EXAM: MRI BRAIN WO CONT    INDICATION: eval for CVA    COMPARISON: CT scans 9/12/2022. CONTRAST: None. TECHNIQUE:  Multiplanar multisequence acquisition without contrast of the brain. FINDINGS:  The ventricles are normal in size and position. There is no acute infarct,  hemorrhage, extra-axial fluid collection, or mass effect.  There is no cerebellar  tonsillar herniation. Expected arterial flow-voids are present. The paranasal sinuses, mastoid air cells, and middle ears are clear, with  exception of a small mucosal retention cyst in the left maxillary sinus. . The  orbital contents are within normal limits. No significant osseous or scalp  lesions are identified. Impression  No evidence for acute infarction or other acute intracranial findings. CT Results (most recent):  Results from Hospital Encounter encounter on 09/12/22    CT CODE NEURO PERF W CBF    Narrative  Clinical history: Code Stroke  INDICATION:   Code Stroke    COMPARISON:  None  CONTRAST: 100ml Isovue 370  TECHNIQUE:  CT dose reduction was achieved through use of a standardized protocol tailored  for this examination and automatic exposure control for dose modulation. Following the uneventful administration of Isovue-370 contrast material, axial  CT angiography of the head and neck was performed. Coronal and sagittal  reconstructions were obtained. 3D MAXIMAL INTENSITY PROJECTION reconstructed imaging of the cranial vasculature  in both the coronal and sagittal plane was performed. CTA perfusion imaging was also obtained. Reconstructions were created with color  coding of axial time to peak, axial blood volume, axial blood flow, axial mean  transit time and axial T Max. The study was analyzed by the Una W Elena Peralta. Algorithm  FINDINGS:  There is no pulmonary mass or nodule. No pneumothorax is identified. .  No large  thyroid lesion. No acute cervical process. No evidence of acute intracranial  hemorrhage. . The paranasal sinuses are clear. CTA NECK:  There is conventional three vessel arch anatomy. No pulmonary mass or nodule. Left vertebral artery slightly larger than the right vertebral artery. Minimal  atherosclerotic change. .  There is  0%stenosis in the right internal carotid artery utilizing NASCET  criteria.   There is  0%stenosis in the left internal carotid artery utilizing NASCET  criteria. CTA HEAD:  Left vertebral artery is slightly larger than the right vertebral artery. Mucous  retention cyst left maxillary sinus. Petrous and cavernous ICAs are within  normal limits. There are A1 segments bilaterally. M1 segments are widely patent. A2 and A3 segments are patent. The basilar artery and its branches are normal.  The internal carotid, anterior cerebral, and middle cerebral arteries are  patent. There is no flow-limiting intracranial stenosis. There is no aneurysm. There are no sizable posterior communicating arteries. CT PERFUSION: No evidence of perfusion mismatch. No evidence of reversible  ischemia. R CBF<30% :0  Tmax >6s: 0    Impression  No acute intracranial process. There is no major vessel occlusion. There is no hemodynamically significant stenosis, aneurysm or dissection  identified. .      Assessment and Plan:   Pt is a 61yo male with h/o migraine with aura, and intermittent HAs that he does not feel are migraine, with HA daily for the last 3 weeks associated with spells of feeling \"off\", feels like he is \"bobble headed\" and off balance when walking, though can have the spells while sitting as well. 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  With the spell yesterday he had dysarthria and felt like it was more severe than other spells so he came to the ED. Ceribell EEG is normal. CTH 9/12/22 also neg. CTA H/N -No LVO, no significant stenosis. MRI brain is neg. CBC with diff - Hgb 19.4. HgbA1C 5.3. LDL 75.8. Exam is non-focal and unremarkable. History is not suggestive of TIA or stroke, especially given these have been going on for 3 weeks and he has yet to have a defining event. Differential includes migraine with aura and less likely seizure.   Recommend trial of Medrol Dosepak and I will order an ambulatory EEG to be done as an outpatient. Follow-up in the neurology clinic with next available provider. Signed: Shadi Yarbrough MD

## 2022-09-13 NOTE — DISCHARGE INSTRUCTIONS
You were admitted with neurological symptoms consistent with migraine with aura  There was no evidence of stroke or TIA  Recommendations from neurology = take and complete a medrol (steroid) dose pack  They will set up a 24hr EEG monitoring  Neurology recommending no driving until your symptoms have been resolved and seizures ruled out    Regarding elevated hemoglobin and hematocrit  Follow up with Dr Katarzyna Molina  for polycythemia lab results and go to his office tomorrow at 12noon for 1 unit phlebotomy  [address] [facility],[address] [address]

## 2022-09-13 NOTE — PROGRESS NOTES
Problem: Patient Education: Go to Patient Education Activity  Goal: Patient/Family Education  Outcome: Resolved/Met     Problem: TIA/CVA Stroke: 0-24 hours  Goal: Off Pathway (Use only if patient is Off Pathway)  Outcome: Resolved/Met  Goal: Activity/Safety  Outcome: Resolved/Met  Goal: Consults, if ordered  Outcome: Resolved/Met  Goal: Diagnostic Test/Procedures  Outcome: Resolved/Met  Goal: Nutrition/Diet  Outcome: Resolved/Met  Goal: Discharge Planning  Outcome: Resolved/Met  Goal: Medications  Outcome: Resolved/Met  Goal: Respiratory  Outcome: Resolved/Met  Goal: Treatments/Interventions/Procedures  Outcome: Resolved/Met  Goal: Minimize risk of bleeding post-thrombolytic infusion  Outcome: Resolved/Met  Goal: Monitor for complications post-thrombolytic infusion  Outcome: Resolved/Met  Goal: Psychosocial  Outcome: Resolved/Met  Goal: *Hemodynamically stable  Outcome: Resolved/Met  Goal: *Neurologically stable  Description: Absence of additional neurological deficits    Outcome: Resolved/Met  Goal: *Verbalizes anxiety and depression are reduced or absent  Outcome: Resolved/Met  Goal: *Absence of Signs of Aspiration on Current Diet  Outcome: Resolved/Met  Goal: *Absence of deep venous thrombosis signs and symptoms(Stroke Metric)  Outcome: Resolved/Met  Goal: *Ability to perform ADLs and demonstrates progressive mobility and function  Outcome: Resolved/Met  Goal: *Stroke education started(Stroke Metric)  Outcome: Resolved/Met  Goal: *Dysphagia screen performed(Stroke Metric)  Outcome: Resolved/Met  Goal: *Rehab consulted(Stroke Metric)  Outcome: Resolved/Met     Problem: TIA/CVA Stroke: Day 2 Until Discharge  Goal: Off Pathway (Use only if patient is Off Pathway)  Outcome: Resolved/Met  Goal: Activity/Safety  Outcome: Resolved/Met  Goal: Diagnostic Test/Procedures  Outcome: Resolved/Met  Goal: Nutrition/Diet  Outcome: Resolved/Met  Goal: Discharge Planning  Outcome: Resolved/Met  Goal: Medications  Outcome: Resolved/Met  Goal: Respiratory  Outcome: Resolved/Met  Goal: Treatments/Interventions/Procedures  Outcome: Resolved/Met  Goal: Psychosocial  Outcome: Resolved/Met  Goal: *Verbalizes anxiety and depression are reduced or absent  Outcome: Resolved/Met  Goal: *Absence of aspiration  Outcome: Resolved/Met  Goal: *Absence of deep venous thrombosis signs and symptoms(Stroke Metric)  Outcome: Resolved/Met  Goal: *Optimal pain control at patient's stated goal  Outcome: Resolved/Met  Goal: *Tolerating diet  Outcome: Resolved/Met  Goal: *Ability to perform ADLs and demonstrates progressive mobility and function  Outcome: Resolved/Met  Goal: *Stroke education continued(Stroke Metric)  Outcome: Resolved/Met     Problem: Ischemic Stroke: Discharge Outcomes  Goal: *Verbalizes anxiety and depression are reduced or absent  Outcome: Resolved/Met  Goal: *Verbalize understanding of risk factor modification(Stroke Metric)  Outcome: Resolved/Met  Goal: *Hemodynamically stable  Outcome: Resolved/Met  Goal: *Absence of aspiration pneumonia  Outcome: Resolved/Met  Goal: *Aware of needed dietary changes  Outcome: Resolved/Met  Goal: *Verbalize understanding of prescribed medications including anti-coagulants, anti-lipid, and/or anti-platelets(Stroke Metric)  Outcome: Resolved/Met  Goal: *Tolerating diet  Outcome: Resolved/Met  Goal: *Aware of follow-up diagnostics related to anticoagulants  Outcome: Resolved/Met  Goal: *Ability to perform ADLs and demonstrates progressive mobility and function  Outcome: Resolved/Met  Goal: *Absence of DVT(Stroke Metric)  Outcome: Resolved/Met  Goal: *Absence of aspiration  Outcome: Resolved/Met  Goal: *Optimal pain control at patient's stated goal  Outcome: Resolved/Met  Goal: *Home safety concerns addressed  Outcome: Resolved/Met  Goal: *Describes available resources and support systems  Outcome: Resolved/Met  Goal: *Verbalizes understanding of activation of EMS(911) for stroke symptoms(Stroke Metric)  Outcome: Resolved/Met  Goal: *Understands and describes signs and symptoms to report to providers(Stroke Metric)  Outcome: Resolved/Met  Goal: *Neurolgocially stable (absence of additional neurological deficits)  Outcome: Resolved/Met  Goal: *Verbalizes importance of follow-up with primary care physician(Stroke Metric)  Outcome: Resolved/Met  Goal: *Smoking cessation discussed,if applicable(Stroke Metric)  Outcome: Resolved/Met  Goal: *Depression screening completed(Stroke Metric)  Outcome: Resolved/Met     Problem: Discharge Planning  Goal: *Discharge to safe environment  Outcome: Resolved/Met  Goal: *Knowledge of medication management  Outcome: Resolved/Met  Goal: *Knowledge of discharge instructions  Outcome: Resolved/Met     Problem: Patient Education: Go to Patient Education Activity  Goal: Patient/Family Education  Outcome: Resolved/Met     Problem: Discharge Planning  Goal: *Discharge to safe environment  Outcome: Resolved/Met

## 2022-09-13 NOTE — CONSULTS
[address]    Name:  Savannah Vasquez  MR#:  [id number]  :  1965  ACCOUNT #:  [de-identified]  DATE OF SERVICE:  2022    HISTORY OF PRESENT ILLNESS:  The patient is a 49-year-old gentleman with erythrocytosis who is seen for initial Hematology evaluation regarding this problem. This gentleman presented with a 1-month history of episodes of slurred speech and dizziness and feeling off balance. Eventually, he had another episode on the day of admission and his wife insisted that he be seen in the emergency room. In the ER, he was evaluated by staff and CBC was drawn which demonstrated a hemoglobin of 17.8, hematocrit 51.8. The rest of the CBC was normal.  He also had a CT scan of the brain performed as on , which showed no acute intracranial process. He had a CT done on , which showed no significant intracranial process and an MRI of the brain was performed on  which also showed no evidence of mass or stroke. He is feeling somewhat better at present. PAST MEDICAL HISTORY:  Significant for:  1. BPH. 2.  Insomnia. PAST SURGICAL HISTORY:  Appendectomy. ALLERGIES:  NO KNOWN DRUG ALLERGIES. FAMILY HISTORY:  There is a strong family history of clotting. 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 He does not smoke and does not currently use alcohol. He is  with four children. REVIEW OF SYSTEMS:  As noted above, otherwise noncontributory. PHYSICAL EXAMINATION:  GENERAL:  Pleasant well-developed, well-nourished male in no apparent stress. VITAL SIGNS:  Stable. He is afebrile. HEENT:  EOMI. Nonicteric sclerae. NECK:  Supple. LUNGS:  Clear. CARDIAC:  Regular rate and rhythm. No murmur. ABDOMEN:  Soft, nontender. No hepatosplenomegaly noted. EXTREMITIES:  No clubbing, cyanosis, or edema. NEUROPSYCHIATRIC:  Grossly intact.     IMPRESSION:  Erythrocytosis in a gentleman who presents with symptoms of dizziness and slurred speech. The erythrocytosis is mild. We usually do not see vasoocclusive symptoms in patients with hemoglobins under 20 or hematocrits under 60. Nonetheless, given the lack of any other findings, I would recommend 1 unit phlebotomy for this gentleman at this time. I would also recommend Neurology evaluation to complete that workup. In terms of the cause of the erythrocytosis, this is a relatively small differential which includes hemochromatosis, polycythemia vera, sleep apnea, and less likely erythropoietin-secreting tumors or underlying malignancy. I recommend that we perform testing for hemochromatosis and polycythemia vera, and I will follow up with him as an outpatient in 2 weeks' time.       Mayo Sahu MD      [id number]  D:  09/13/2022 13:51  T:  09/13/2022 15:49  JOB #:  [id number]

## 2022-09-13 NOTE — PROGRESS NOTES
Bedside and Verbal shift change report given to  Irina Perkins   (oncoming nurse) by [facility] (offgoing nurse). Report included the following information SBAR, Kardex, ED Summary, Procedure Summary, Accordion, Recent Results, Cardiac Rhythm SB/SR, and Dual Neuro Assessment.

## 2022-09-13 NOTE — PROGRESS NOTES
Transition of Care Plan  RUR- Observation   DISPOSITION: Home with spouse when stable  F/U with PCP/Specialist    Transport: Self    Reason for Admission:  stroke rule out                     RUR Score:          observation            Plan for utilizing home health:  no hx of home health, therapy signed off without any recommendations for DC        PCP: First and Last name:  Isma Smis MD     Name of Practice:    Are you a current patient: Yes/No: Yes   Approximate date of last visit: 1 month   Can you participate in a virtual visit with your PCP:                     Current Advanced Directive/Advance Care Plan: Full Code      Healthcare Decision Maker: Spouse, Louie Maxwell  Click here to [address] including selection of the Healthcare Decision Maker Relationship (ie \"Primary\")                             Transition of Care Plan:                      CM met with patient at bedside to introduce self and role. Living situation: lives with spouse in 1 story home, 4 steps to enter  ADLs: independent, works full time owns a small candy company  DME: none  Previous IPR/SNF: none  Previous home health: none  Demographics: [facility], [facility] insured  Pharmacy: [facility]  Main point of contact: Louie Maxwell [phone]    The program assesses the family and/or caregiver's readiness, willingness, and ability to provide or support and self-management activities for the patient as needed. No CM needs identified for patient. Care Management Interventions  PCP Verified by CM:  Yes  Palliative Care Criteria Met (RRAT>21 & CHF Dx)?: No  Mode of Transport at Discharge: Self  Transition of Care Consult (CM Consult): Discharge Planning  MyChart Signup: Yes  Discharge Durable Medical Equipment: No  Health Maintenance Reviewed: Yes  Physical Therapy Consult: Yes  Occupational Therapy Consult: Yes  Speech Therapy Consult: Yes  Support Systems: Spouse/Significant Other  Confirm Follow Up Transport: Self  The Plan for Transition of Care is Related to the Following Treatment Goals : home  Name of the Patient Representative Who was Provided with a Choice of Provider and Agrees with the Discharge Plan: patient  Discharge [facility]  [facility] RASHIDA Mendoza.

## 2022-09-13 NOTE — PROGRESS NOTES
Cary Bates provided to patient/representative with verbal explanation of the notice. Time allotted for questions regarding the notice. Patient /representative provided a completed copy of the VOON notice. Copy placed on bedside chart.   Radha Nichole, Care Management Assistant

## 2022-09-13 NOTE — PROGRESS NOTES
Bedside RN performed patient education and medication education. Discharge concerns initiated and discussed with patient, including clarification on \"who\" assists the patient at their home and instructions for when the home going patient should call their provider after discharge. Opportunity for questions and clarification was provided. Patient receptive to education: YES  Patient stated: Acceptance  Barriers to Education: None  Diagnosis Education given:  YES    Length of stay: 0  Expected Day of Discharge: 9/13/22  Ask if they have \"Help at Home\" & add to white board?   YES    Education Day #: 2    Medication Education Given:  YES  M in the box Medication name: Steroid    Pt aware of HCAHPS survey: YES          Stroke Education documented in Patient Education: YES  Core Measures Documented in Connect [facility]:  Risk Factors: YES  Warning signs of stroke: YES  When to Activate 911: YES  Medication Education for Risk Factors: YES  Smoking cessation if applicable: YES  Written Education Given:  YES    Discharge NIH Completed: YES  Score: 0    BRAINS: YES    Follow Up Appointment Made: YES  Date/Time if applicable: 5/34

## 2022-09-13 NOTE — DISCHARGE SUMMARY
.     Discharge Summary       PATIENT ID: John Dwyer  MRN: [id number]   YOB: 1965    DATE OF ADMISSION: 9/12/2022 10:34 AM    DATE OF DISCHARGE: 9/13/22 4:50pm   PRIMARY CARE PROVIDER: Lukas Carrion MD     ATTENDING PHYSICIAN: Melany Stewart  DISCHARGING PROVIDER: Tucker Paredes MD    To contact this individual call [phone] and ask the  to page. If unavailable ask to be transferred the Adult Hospitalist Department. CONSULTATIONS: IP CONSULT TO HOSPITALIST  IP CONSULT TO NEUROLOGY  IP CONSULT TO HEMATOLOGY    PROCEDURES/SURGERIES: * No surgery found *    ADMISSION SUMMARY AND HOSPITAL COURSE:   John Dwyer is a 59 y.o. male who presents with  slurred speech, dizziness and feeling off balance. Patient reports that he was well this morning and then around 8AM while at work he started having some difficulty with speech, finding his words, slurred speech and feeling as if he was off balance. Patient reports still having \"some head fog and feeling off balance. \"  He does report having improvement of of his brain fog but reports still feeling off balance. He denies any vision changes, denies any weakness or numbness of her upper extremities. Patient was told recently that he had elevated hemoglobin hematocrit levels and he does report having a family history of hypercoagulability disorder. Patient does not smoke. Denies any history of high blood pressure high cholesterol or diabetes.   Denies history of stroke    DISCHARGE DIAGNOSES / PLAN:      1) dizziness ataxia slurred speech-recurrent episodes-admit for neurological evaluation and stroke work-up  CTAs and CT of the head were unremarkable-brain MRI ordered stroke order set used  Neurology consulted and attributed the symptoms to migraines with aura and planning outpatient ambulatory 24hr EEG to rule out seizures  OT PT and speech not needed- no reason to do carotid Dopplers and echocardiogram with negative brain MRI result  2) history of lipidemia but never started on medications-recheck fasting lipid panel in a.m. tomorrow- triglycerides elevated- follow up with PCP to discuss  3) elevated creatinine -resolved   not chronic kidney disease based on a normal-retroperitoneal ultrasound , and normal creatinine without IVFluids  resume patient's home med potassium citrate which he takes for renal stones on discharge  UA negative for proteins and/or blood  4) alopecia-restart finasteride on discharge  5) insomnia-resume Ambien and melatonin on discharge  6) elevated hematocrit and hemoglobin-suspicious for polycythemia-consulted hematology for evaluation, normal iron levels-     PENDING TEST RESULTS:   At the time of discharge the following test results are still pending: labs for polycythemia     ADDITIONAL CARE RECOMMENDATIONS:   You were admitted with neurological symptoms consistent with migraine with aura  There was no evidence of stroke or TIA  Recommendations from neurology = take and complete a medrol (steroid) dose pack  They will set up a 24hr EEG monitoring  Neurology recommending no driving until your symptoms have been resolved and seizures ruled out    Regarding elevated hemoglobin and hematocrit  Follow up with Dr Juan C Scherer  for polycythemia lab results and go to his office tomorrow at 12noon for 1 unit phlebotomy  [phone]  [address] [place] [address] [address] [address]        NOTIFY YOUR PHYSICIAN FOR ANY OF THE FOLLOWING:   Fever over 101 degrees for 24 hours. Chest pain, shortness of breath, fever, chills, nausea, vomiting, diarrhea, change in mentation, falling, weakness, bleeding. Severe pain or pain not relieved by medications, as well as any other signs or symptoms that you may have questions about.     FOLLOW UP APPOINTMENTS:    Follow-up Information       Follow up With Specialties Details Why Contact Info    Ritu Boggs MD Hematology and Oncology, Hematology Physician, Oncology Go on 9/14/2022 at 12:noon [address] [address] [phone]      Rita Villalobos MD Neurology Call for results of 24hr ambulatory EEG, and to discuss additional treatments for migraines w aura [address] [facility] [facility]      Maurice Barone MD Internal Medicine Physician   [address] [address] [address] [facility] [id number] [phone]                 DIET: Regular Diet    ACTIVITY: Activity as tolerated    EQUIPMENT needed: none    DISCHARGE MEDICATIONS:  Current Discharge Medication List        START taking these medications    Details   methylPREDNISolone (MEDROL DOSEPACK) 4 mg tablet Per dose pack instructions  Qty: 1 Dose Pack, Refills: 0  Start date: 9/13/2022           CONTINUE these medications which have NOT CHANGED    Details   acetaminophen (TYLENOL) 500 mg tablet Take  by mouth as needed (headache). melatonin 5 mg tablet Take 10 mg by mouth nightly. aspirin (ASPIR-81 PO) Take 81 mg by mouth daily.       finasteride (PROPECIA) 1 mg tablet TAKE 1 TABLET BY MOUTH ONE TIME DAILY  Qty: 90 Tablet, Refills: 0      sildenafil citrate (VIAGRA) 100 mg tablet TAKE 1 TABLET BY MOUTH AS NEEDED FOR ERECTILE DYSFUNCTION  Qty: 15 Tablet, Refills: 3    Associated Diagnoses: Drug-induced erectile dysfunction      zolpidem CR (AMBIEN CR) 6.25 mg tablet TAKE 1 TABLET BY MOUTH NIGHTLY AS NEEDED FOR SLEEP  Qty: 30 Tablet, Refills: 1    Associated Diagnoses: Other insomnia      potassium citrate (UROCIT-K10) 10 mEq (1,080 mg) TbER TAKE 1 TABLET BY MOUTH ONE TIME DAILY  Qty: 90 Tablet, Refills: 2    Associated Diagnoses: Renal stones             DISPOSITION:    Home With:   OT  PT  HH  RN       Long term SNF/Inpatient Rehab   X Independent  living    Hospice    Other:       PATIENT CONDITION AT DISCHARGE:     Functional status    Poor     Deconditioned    X Independent      Cognition    X Lucid     Forgetful     Dementia      Catheters/lines (plus indication)    Goodman     PICC PEG    X None      Code status   X  Full code     DNR      PHYSICAL EXAMINATION AT DISCHARGE:  Patient Vitals for the past 24 hrs:   Temp Pulse Resp BP SpO2   09/13/22 1421 97.7 °F (36.5 °C) 94 -- 119/84 --   09/13/22 1007 98 °F (36.7 °C) 70 18 108/82 97 %   09/13/22 1001 -- (!) 114 -- (!) 125/107 --   09/13/22 1000 -- 97 -- 126/88 --   09/13/22 0958 -- 100 -- 129/89 --   09/13/22 0600 98.3 °F (36.8 °C) 64 19 123/85 99 %   09/13/22 0200 97.9 °F (36.6 °C) 74 14 (!) 126/93 99 %   09/12/22 2200 98.3 °F (36.8 °C) 64 12 (!) 136/90 99 %   09/12/22 1749 98.1 °F (36.7 °C) 64 16 (!) 131/95 --      General: Alert x oriented x 3, awake, no acute distress, resting in bed, pleasant male, appears to be stated age  HEENT: PEERL, EOMI, moist mucus membranes  Neck: Supple, no JVD, no meningeal signs  Chest: Clear to auscultation bilaterally   CVS: RRR, S1 S2 heard, no murmurs/rubs/gallops  Abd: Soft, non-tender, non-distended, +bowel sounds   Ext: No clubbing, no cyanosis, no edema  Neuro/Psych: Pleasant mood and affect, CN 2-12 grossly intact, sensory grossly within normal limit, Strength 5/5 in all extremities,   Pulses: 2+, symmetric in all extremities  Skin: Warm, dry, without rashes or lesions     Data Review: All diagnostic labs and studies have been reviewed.      Recent Results          Recent Results (from the past 24 hour(s))   EKG, 12 LEAD, INITIAL     Collection Time: 09/12/22 10:07 AM   Result Value Ref Range     Ventricular Rate 66 BPM     Atrial Rate 66 BPM     P-R Interval 168 ms     QRS Duration 84 ms     Q-T Interval 374 ms     QTC Calculation (Bezet) 392 ms     Calculated P Axis 45 degrees     Calculated R Axis 78 degrees     Calculated T Axis 68 degrees     Diagnosis           Normal sinus rhythm  Normal ECG  No previous ECGs available  Confirmed by Shawanda Kwon MD ([id number]) on 9/12/2022 11:48:01 AM      SAMPLES BEING HELD     Collection Time: 09/12/22 10:20 AM   Result Value Ref Range     SAMPLES BEING HELD 1RED       COMMENT           Add-on orders for these samples will be processed based on acceptable specimen integrity and analyte stability, which may vary by analyte. METABOLIC PANEL, COMPREHENSIVE     Collection Time: 09/12/22 10:20 AM   Result Value Ref Range     Sodium 140 136 - 145 mmol/L     Potassium 4.6 3.5 - 5.1 mmol/L     Chloride 110 (H) 97 - 108 mmol/L     CO2 24 21 - 32 mmol/L     Anion gap 6 5 - 15 mmol/L     Glucose 93 65 - 100 mg/dL     BUN 15 6 - 20 MG/DL     Creatinine 1.38 (H) 0.70 - 1.30 MG/DL     BUN/Creatinine ratio 11 (L) 12 - 20       GFR est AA >60 >60 ml/min/1.73m2     GFR est non-AA 53 (L) >60 ml/min/1.73m2     Calcium 9.0 8.5 - 10.1 MG/DL     Bilirubin, total 1.2 (H) 0.2 - 1.0 MG/DL     ALT (SGPT) 31 12 - 78 U/L     AST (SGOT) 29 15 - 37 U/L     Alk.  phosphatase 64 45 - 117 U/L     Protein, total 7.1 6.4 - 8.2 g/dL     Albumin 3.7 3.5 - 5.0 g/dL     Globulin 3.4 2.0 - 4.0 g/dL     A-G Ratio 1.1 1.1 - 2.2     TROPONIN-HIGH SENSITIVITY     Collection Time: 09/12/22 10:20 AM   Result Value Ref Range     Troponin-High Sensitivity 11 0 - 76 ng/L   MAGNESIUM     Collection Time: 09/12/22 10:20 AM   Result Value Ref Range     Magnesium 2.3 1.6 - 2.4 mg/dL   C REACTIVE PROTEIN, QT     Collection Time: 09/12/22 10:20 AM   Result Value Ref Range     C-Reactive protein <0.29 0.00 - 0.60 mg/dL   GLUCOSE, POC     Collection Time: 09/12/22 10:40 AM   Result Value Ref Range     Glucose (POC) 85 65 - 117 mg/dL     Performed by [facility] Extension + INR     Collection Time: 09/12/22 11:14 AM   Result Value Ref Range     INR 1.0 0.9 - 1.1       Prothrombin time 10.5 9.0 - 11.1 sec   CBC WITH AUTOMATED DIFF     Collection Time: 09/12/22 11:14 AM   Result Value Ref Range     WBC 8.2 4.1 - 11.1 K/uL     RBC 6.25 (H) 4.10 - 5.70 M/uL     HGB 19.4 (H) 12.1 - 17.0 g/dL     HCT 55.9 (H) 36.6 - 50.3 %     MCV 89.4 80.0 - 99.0 FL     MCH 31.0 26.0 - 34.0 PG     MCHC 34.7 30.0 - 36.5 g/dL     RDW 14.5 11.5 - 14.5 %     PLATELET 885 504 - 048 K/uL     MPV 9.7 8.9 - 12.9 FL     NRBC 0.0 0  WBC     ABSOLUTE NRBC 0.00 0.00 - 0.01 K/uL     NEUTROPHILS 67 32 - 75 %     LYMPHOCYTES 19 12 - 49 %     MONOCYTES 9 5 - 13 %     EOSINOPHILS 4 0 - 7 %     BASOPHILS 1 0 - 1 %     IMMATURE GRANULOCYTES 0 0.0 - 0.5 %     ABS. NEUTROPHILS 5.6 1.8 - 8.0 K/UL     ABS. LYMPHOCYTES 1.5 0.8 - 3.5 K/UL     ABS. MONOCYTES 0.7 0.0 - 1.0 K/UL     ABS. EOSINOPHILS 0.3 0.0 - 0.4 K/UL     ABS. BASOPHILS 0.0 0.0 - 0.1 K/UL     ABS. IMM.  GRANS. 0.0 0.00 - 0.04 K/UL     DF AUTOMATED     COVID-19 RAPID TEST     Collection Time: 09/12/22  2:28 PM   Result Value Ref Range     Specimen source Nasopharyngeal       COVID-19 rapid test Not detected NOTD     URINALYSIS W/MICROSCOPIC     Collection Time: 09/12/22  2:29 PM   Result Value Ref Range     Color YELLOW/STRAW       Appearance CLEAR CLEAR       Specific gravity >1.030       pH (UA) 5.0 5.0 - 8.0       Protein Negative NEG mg/dL     Glucose Negative NEG mg/dL     Ketone Negative NEG mg/dL     Bilirubin Negative NEG       Blood Negative NEG       Urobilinogen 0.2 0.2 - 1.0 EU/dL     Nitrites Negative NEG       Leukocyte Esterase Negative NEG       WBC 0-4 0 - 4 /hpf     RBC 0-5 0 - 5 /hpf     Epithelial cells FEW FEW /lpf     Bacteria Negative NEG /hpf     Hyaline cast 0-2 0 - 5 /lpf   DRUG SCREEN, URINE     Collection Time: 09/12/22  2:29 PM   Result Value Ref Range     AMPHETAMINES Negative NEG       BARBITURATES Negative NEG       BENZODIAZEPINES Negative NEG       COCAINE Negative NEG       METHADONE Negative NEG       OPIATES Negative NEG       PCP(PHENCYCLIDINE) Negative NEG       THC (TH-CANNABINOL) Negative NEG       Drug screen comment (NOTE)     AMMONIA     Collection Time: 09/12/22  2:29 PM   Result Value Ref Range     Ammonia, plasma 24 <32 UMOL/L               All Micro Results         Procedure Component Value Units Date/Time     COVID-19 RAPID TEST [[id number]] Collected: 09/12/22 1428     Order Status: Completed Specimen: Nasopharyngeal Updated: 09/12/22 1452       Specimen source Nasopharyngeal           COVID-19 rapid test Not detected           Comment: Rapid Abbott ID Now       Rapid NAAT:  The specimen is NEGATIVE for SARS-CoV-2, the novel coronavirus associated with COVID-19. Negative results should be treated as presumptive and, if inconsistent with clinical signs and symptoms or necessary for patient management, should be tested with an alternative molecular assay. Negative results do not preclude SARS-CoV-2 infection and should not be used as the sole basis for patient management decisions. This test has been authorized by the FDA under an Emergency Use Authorization (EUA) for use by authorized laboratories. Fact sheet for Healthcare Providers: [employer]found[employer].co.nz  Fact sheet for Patients: [employer]co[employer]nz       Methodology: Isothermal Nucleic Acid Amplification                US RETROPERITONEUM COMP     INDICATION:  elevated creatinine     COMPARISON: None. TECHNIQUE:  Real-time sonography of the kidneys, retroperitoneum and bladder was performed  with multiple static images obtained. FINDINGS:  The bladder is partially decompressed. No intravesicular mass or stone is seen. Bilateral ureteral jets are observed. The abdominal aorta is normal in caliber. The distal aorta measures 1.6 cm diameter. Bilateral iliac arteries are  unremarkable. RIGHT kidney measures 10.7 x 4.9 x 5.3 cm. LEFT kidney measures 11.3 x 5.7 x 4.4  cm. The echotexture is normal. There is no hydronephrosis. No complex cystic or  solid renal mass is seen. No stone is demonstrated. Kidneys are vascularized. The IVC is not well-visualized due to bowel gas. IMPRESSION     1. No hydronephrosis. 2. No evidence of renal mass or stone. MRI BRAIN WO CONT     INDICATION: eval for CVA     COMPARISON: CT scans 9/12/2022. CONTRAST: None. TECHNIQUE:    Multiplanar multisequence acquisition without contrast of the brain. FINDINGS:  The ventricles are normal in size and position. There is no acute infarct,  hemorrhage, extra-axial fluid collection, or mass effect. There is no cerebellar  tonsillar herniation. Expected arterial flow-voids are present. The paranasal sinuses, mastoid air cells, and middle ears are clear, with  exception of a small mucosal retention cyst in the left maxillary sinus. . The  orbital contents are within normal limits. No significant osseous or scalp  lesions are identified. IMPRESSION  No evidence for acute infarction or other acute intracranial findings. IMAGING:   CTA CODE NEURO HEAD AND NECK W CONT   Final Result   No acute intracranial process. There is no major vessel occlusion. There is no hemodynamically significant stenosis, aneurysm or dissection   identified. .               CT CODE NEURO PERF W CBF   Final Result   No acute intracranial process. There is no major vessel occlusion. There is no hemodynamically significant stenosis, aneurysm or dissection   identified. .               XR CHEST PORT   Final Result   Normal chest.        CT CODE NEURO HEAD WO CONTRAST   Final Result       No acute process.        MRI BRAIN WO CONT    (Results Pending)   US RETROPERITONEUM COMP    (Results Pending)         ECG/ECHO:           Results for orders placed or performed during the hospital encounter of 09/12/22   EKG, 12 LEAD, INITIAL   Result Value Ref Range     Ventricular Rate 66 BPM     Atrial Rate 66 BPM     P-R Interval 168 ms     QRS Duration 84 ms     Q-T Interval 374 ms     QTC Calculation (Bezet) 392 ms     Calculated P Axis 45 degrees     Calculated R Axis 78 degrees     Calculated T Axis 68 degrees     Diagnosis           Normal sinus rhythm  Normal ECG  No previous ECGs available  Confirmed by Jeff Rojas MD ([id number]) on 9/12/2022 11:48:01 AM         CHRONIC MEDICAL DIAGNOSES:           Codes Class Noted - Resolved    Ataxia ICD-10-CM: R27.0  ICD-9-CM: 781.3  9/12/2022 - Present        Polycythemia ICD-10-CM: D75.1  ICD-9-CM: 238.4  5/4/2021 - Present    Overview Signed 5/4/2021  4:55 PM by Piper Obregon MD     HB 18.3 in 8/20             Renal stones ICD-10-CM: N20.0  ICD-9-CM: 592.0  5/3/2021 - Present                Drug-induced erectile dysfunction ICD-10-CM: N52.2  ICD-9-CM: 607.84, E980.5  5/3/2021 - Present        Other insomnia ICD-10-CM: G47.09  ICD-9-CM: 780.52  5/3/2021 - Present         Greater than 30 minutes were spent with the patient on counseling and coordination of care    Signed:   Zuleika Ramírez MD  9/13/2022  4:50 PM

## 2022-09-13 NOTE — PROGRESS NOTES
Problem: Patient Education: Go to Patient Education Activity  Goal: Patient/Family Education  Outcome: Progressing Towards Goal     Problem: TIA/CVA Stroke: 0-24 hours  Goal: Off Pathway (Use only if patient is Off Pathway)  Outcome: Progressing Towards Goal  Goal: Activity/Safety  Outcome: Progressing Towards Goal  Goal: Consults, if ordered  Outcome: Progressing Towards Goal  Goal: Diagnostic Test/Procedures  Outcome: Progressing Towards Goal  Goal: Nutrition/Diet  Outcome: Progressing Towards Goal  Goal: Discharge Planning  Outcome: Progressing Towards Goal  Goal: Medications  Outcome: Progressing Towards Goal  Goal: Respiratory  Outcome: Progressing Towards Goal  Goal: Treatments/Interventions/Procedures  Outcome: Progressing Towards Goal  Goal: Minimize risk of bleeding post-thrombolytic infusion  Outcome: Progressing Towards Goal  Goal: Monitor for complications post-thrombolytic infusion  Outcome: Progressing Towards Goal  Goal: Psychosocial  Outcome: Progressing Towards Goal  Goal: *Hemodynamically stable  Outcome: Progressing Towards Goal  Goal: *Neurologically stable  Description: Absence of additional neurological deficits    Outcome: Progressing Towards Goal  Goal: *Verbalizes anxiety and depression are reduced or absent  Outcome: Progressing Towards Goal  Goal: *Absence of Signs of Aspiration on Current Diet  Outcome: Progressing Towards Goal  Goal: *Absence of deep venous thrombosis signs and symptoms(Stroke Metric)  Outcome: Progressing Towards Goal  Goal: *Ability to perform ADLs and demonstrates progressive mobility and function  Outcome: Progressing Towards Goal  Goal: *Stroke education started(Stroke Metric)  Outcome: Progressing Towards Goal  Goal: *Dysphagia screen performed(Stroke Metric)  Outcome: Progressing Towards Goal  Goal: *Rehab consulted(Stroke Metric)  Outcome: Progressing Towards Goal     Problem: TIA/CVA Stroke: Day 2 Until Discharge  Goal: Off Pathway (Use only if patient is Off Pathway)  Outcome: Progressing Towards Goal  Goal: Activity/Safety  Outcome: Progressing Towards Goal  Goal: Diagnostic Test/Procedures  Outcome: Progressing Towards Goal  Goal: Nutrition/Diet  Outcome: Progressing Towards Goal  Goal: Discharge Planning  Outcome: Progressing Towards Goal  Goal: Medications  Outcome: Progressing Towards Goal  Goal: Respiratory  Outcome: Progressing Towards Goal  Goal: Treatments/Interventions/Procedures  Outcome: Progressing Towards Goal  Goal: Psychosocial  Outcome: Progressing Towards Goal  Goal: *Verbalizes anxiety and depression are reduced or absent  Outcome: Progressing Towards Goal  Goal: *Absence of aspiration  Outcome: Progressing Towards Goal  Goal: *Absence of deep venous thrombosis signs and symptoms(Stroke Metric)  Outcome: Progressing Towards Goal  Goal: *Optimal pain control at patient's stated goal  Outcome: Progressing Towards Goal  Goal: *Tolerating diet  Outcome: Progressing Towards Goal  Goal: *Ability to perform ADLs and demonstrates progressive mobility and function  Outcome: Progressing Towards Goal  Goal: *Stroke education continued(Stroke Metric)  Outcome: Progressing Towards Goal     Problem: Ischemic Stroke: Discharge Outcomes  Goal: *Verbalizes anxiety and depression are reduced or absent  Outcome: Progressing Towards Goal  Goal: *Verbalize understanding of risk factor modification(Stroke Metric)  Outcome: Progressing Towards Goal  Goal: *Hemodynamically stable  Outcome: Progressing Towards Goal  Goal: *Absence of aspiration pneumonia  Outcome: Progressing Towards Goal  Goal: *Aware of needed dietary changes  Outcome: Progressing Towards Goal  Goal: *Verbalize understanding of prescribed medications including anti-coagulants, anti-lipid, and/or anti-platelets(Stroke Metric)  Outcome: Progressing Towards Goal  Goal: *Tolerating diet  Outcome: Progressing Towards Goal  Goal: *Aware of follow-up diagnostics related to anticoagulants  Outcome: Progressing Towards Goal  Goal: *Ability to perform ADLs and demonstrates progressive mobility and function  Outcome: Progressing Towards Goal  Goal: *Absence of DVT(Stroke Metric)  Outcome: Progressing Towards Goal  Goal: *Absence of aspiration  Outcome: Progressing Towards Goal  Goal: *Optimal pain control at patient's stated goal  Outcome: Progressing Towards Goal  Goal: *Home safety concerns addressed  Outcome: Progressing Towards Goal  Goal: *Describes available resources and support systems  Outcome: Progressing Towards Goal  Goal: *Verbalizes understanding of activation of EMS(911) for stroke symptoms(Stroke Metric)  Outcome: Progressing Towards Goal  Goal: *Understands and describes signs and symptoms to report to providers(Stroke Metric)  Outcome: Progressing Towards Goal  Goal: *Neurolgocially stable (absence of additional neurological deficits)  Outcome: Progressing Towards Goal  Goal: *Verbalizes importance of follow-up with primary care physician(Stroke Metric)  Outcome: Progressing Towards Goal  Goal: *Smoking cessation discussed,if applicable(Stroke Metric)  Outcome: Progressing Towards Goal  Goal: *Depression screening completed(Stroke Metric)  Outcome: Progressing Towards Goal     Problem: Discharge Planning  Goal: *Discharge to safe environment  Outcome: Progressing Towards Goal  Goal: *Knowledge of medication management  Outcome: Progressing Towards Goal  Goal: *Knowledge of discharge instructions  Outcome: Progressing Towards Goal     Problem: Patient Education: Go to Patient Education Activity  Goal: Patient/Family Education  Outcome: Progressing Towards Goal

## 2022-09-14 ENCOUNTER — TELEPHONE (OUTPATIENT)
Dept: NEUROLOGY | Age: 57
End: 2022-09-14

## 2022-09-14 LAB — EPO SERPL-ACNC: 12.7 MIU/ML (ref 2.6–18.5)

## 2022-09-14 NOTE — PROCEDURES
PROCEDURE: RAPID EEG  NAME:   Trina Richter  ACCOUNT NUMBER : [de-identified]  MRN:   [id number]  DATE OF SERVICE: 9/12/22    HISTORY/INDICATION: Pt is a 58yo with spells of feeling \"off\", feels like he is \"bobble headed,\" and off balance when walking, though can have the spells while sitting as well. 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 This rapid EEG was performed in the ED while patient was symptomatic. MEDICATIONS:   Current Outpatient Medications   Medication Sig Dispense Refill    methylPREDNISolone (MEDROL DOSEPACK) 4 mg tablet Per dose pack instructions 1 Dose Pack 0    acetaminophen (TYLENOL) 500 mg tablet Take  by mouth as needed (headache). melatonin 5 mg tablet Take 10 mg by mouth nightly. aspirin (ASPIR-81 PO) Take 81 mg by mouth daily. finasteride (PROPECIA) 1 mg tablet TAKE 1 TABLET BY MOUTH ONE TIME DAILY 90 Tablet 0    sildenafil citrate (VIAGRA) 100 mg tablet TAKE 1 TABLET BY MOUTH AS NEEDED FOR ERECTILE DYSFUNCTION 15 Tablet 3    zolpidem CR (AMBIEN CR) 6.25 mg tablet TAKE 1 TABLET BY MOUTH NIGHTLY AS NEEDED FOR SLEEP 30 Tablet 1    potassium citrate (UROCIT-K10) 10 mEq (1,080 mg) TbER TAKE 1 TABLET BY MOUTH ONE TIME DAILY 90 Tablet 2       CONDITIONS OF RECORDING: This EEG was obtained using a 10 lead, 8 channel system positioned circumferentially without any parasagittal coverage (rapid EEG). Computer selected EEG is reviewed as well as background features and all clinically significant events. Clarity algorithm utilized and implemented to provide analysis of underlying activity and seizure detection used to facilitate reading. This study began on 9/12/22 at 12:40:03 and ended on 9/13/22 at 1605. I have reviewed this record in its entirety.        DESCRIPTION:   There is a posterior dominant rhythm of 9Hz with an amplitude of 20uV that is symmetric in the bilateral posterior derivations. There are no epileptiform discharges or electrographic seizures seen. INTERPRETATION: This is a normal, but limited rapid EEG    CLINICAL CORRELATION: If clinical suspicion is high, consider routine EEG or more prolonged monitoring.     Pamela Plaza MD

## 2022-09-14 NOTE — TELEPHONE ENCOUNTER
Called patient. Patient was informed that the doctor ordered an 24 hour ambulatory EEG. The phone number to central scheduling was given to the patient.

## 2022-09-17 DIAGNOSIS — N20.0 RENAL STONES: ICD-10-CM

## 2022-09-17 RX ORDER — POTASSIUM CITRATE 10 MEQ/1
TABLET, EXTENDED RELEASE ORAL
Qty: 90 TABLET | Refills: 0 | Status: SHIPPED | OUTPATIENT
Start: 2022-09-17

## 2022-09-19 LAB
BACKGROUND, CALR2T: NORMAL
CALR MUTATION DETECTION RESULT, CALR1T: NORMAL
LAB DIRECTOR NAME PROVIDER: NORMAL
REF LAB TEST METHOD: NORMAL
REFERENCES, CALR4T: NORMAL

## 2022-09-20 LAB
HFE GENE MUT ANL BLD/T: NORMAL
LAB DIRECTOR NAME PROVIDER: NORMAL
MPL GENE MUT TESTED BLD/T: NORMAL
MPL P.W515L+W515K+S505N BLD/T QL: NORMAL
REFERENCES, MPLM6T: NORMAL
SERVICE CMNT-IMP: NORMAL

## 2022-09-27 LAB
BACKGROUND, 489262: NORMAL
BACKGROUND: 489207: NORMAL
DIRECTOR REVIEW: 489204: NORMAL
DIRECTOR REVIEW: 489218: NORMAL
EXTRACTION: NORMAL
JAK2 EXONS 12-15 MUT DET PCR: NORMAL
JAK2 P.V617F BLD/T QL: NORMAL
METHODOLOGY: 480899: NORMAL
REFERENCES: 480870: NORMAL
REFLEX: 489231: NORMAL

## 2022-10-18 DIAGNOSIS — G47.09 OTHER INSOMNIA: ICD-10-CM

## 2022-10-18 RX ORDER — ZOLPIDEM TARTRATE 6.25 MG/1
TABLET, FILM COATED, EXTENDED RELEASE ORAL
Qty: 30 TABLET | Refills: 0 | Status: SHIPPED | OUTPATIENT
Start: 2022-10-18

## 2022-12-27 DIAGNOSIS — N52.2 DRUG-INDUCED ERECTILE DYSFUNCTION: ICD-10-CM

## 2022-12-27 DIAGNOSIS — G47.09 OTHER INSOMNIA: ICD-10-CM

## 2022-12-27 RX ORDER — ZOLPIDEM TARTRATE 6.25 MG/1
TABLET, FILM COATED, EXTENDED RELEASE ORAL
Qty: 30 TABLET | Refills: 0 | Status: SHIPPED | OUTPATIENT
Start: 2022-12-27

## 2022-12-27 RX ORDER — SILDENAFIL 100 MG/1
TABLET, FILM COATED ORAL
Qty: 15 TABLET | Refills: 0 | Status: SHIPPED | OUTPATIENT
Start: 2022-12-27

## 2023-02-01 DIAGNOSIS — N52.2 DRUG-INDUCED ERECTILE DYSFUNCTION: ICD-10-CM

## 2023-02-01 DIAGNOSIS — G47.09 OTHER INSOMNIA: ICD-10-CM

## 2023-02-01 RX ORDER — SILDENAFIL 100 MG/1
TABLET, FILM COATED ORAL
Qty: 15 TABLET | Refills: 0 | Status: SHIPPED | OUTPATIENT
Start: 2023-02-01

## 2023-02-01 RX ORDER — ZOLPIDEM TARTRATE 6.25 MG/1
TABLET, FILM COATED, EXTENDED RELEASE ORAL
Qty: 30 TABLET | Refills: 0 | Status: SHIPPED | OUTPATIENT
Start: 2023-02-01

## 2023-06-22 DIAGNOSIS — G47.09 OTHER INSOMNIA: Primary | ICD-10-CM

## 2023-06-26 RX ORDER — SILDENAFIL 100 MG/1
TABLET, FILM COATED ORAL
Qty: 15 TABLET | Refills: 0 | Status: SHIPPED | OUTPATIENT
Start: 2023-06-26

## 2023-06-26 RX ORDER — POTASSIUM CITRATE 10 MEQ/1
TABLET, EXTENDED RELEASE ORAL
Qty: 90 TABLET | Refills: 0 | Status: SHIPPED | OUTPATIENT
Start: 2023-06-26

## 2023-06-26 RX ORDER — ZOLPIDEM TARTRATE 6.25 MG/1
TABLET, FILM COATED, EXTENDED RELEASE ORAL
Qty: 30 TABLET | Refills: 0 | Status: SHIPPED | OUTPATIENT
Start: 2023-06-26 | End: 2023-07-26

## 2023-09-08 ENCOUNTER — TELEPHONE (OUTPATIENT)
Age: 58
End: 2023-09-08

## 2023-09-08 DIAGNOSIS — G47.09 OTHER INSOMNIA: ICD-10-CM

## 2023-09-08 RX ORDER — ZOLPIDEM TARTRATE 6.25 MG/1
TABLET, FILM COATED, EXTENDED RELEASE ORAL
Qty: 30 TABLET | Refills: 0 | OUTPATIENT
Start: 2023-09-08 | End: 2023-10-08

## 2023-09-08 RX ORDER — SILDENAFIL 100 MG/1
TABLET, FILM COATED ORAL
Qty: 15 TABLET | Refills: 0 | OUTPATIENT
Start: 2023-09-08

## 2023-09-08 NOTE — TELEPHONE ENCOUNTER
Pt is requesting a refill for the following medication      zolpidem (AMBIEN CR) 6.25 MG extended release tablet

## 2023-11-08 DIAGNOSIS — G47.09 OTHER INSOMNIA: ICD-10-CM

## 2023-11-08 NOTE — TELEPHONE ENCOUNTER
Refill request received from [employer] for   Requested Prescriptions     Pending Prescriptions Disp Refills    zolpidem (AMBIEN CR) 6.25 MG extended release tablet 30 tablet 0     Sig: TAKE ONE TABLET BY MOUTH AT BEDTIME AS NEEDED FOR SLEEP     8/22/2022 12/28/2023     Routed to Dr Sadie Nation for review.

## 2023-11-08 NOTE — TELEPHONE ENCOUNTER
Patient call for refill of zolpidem (AMBIEN CR) 6.25 MG extended release tablet   To be sent to the pharmacy on file.  Have appointment scheduled

## 2023-11-09 RX ORDER — ZOLPIDEM TARTRATE 6.25 MG/1
TABLET, FILM COATED, EXTENDED RELEASE ORAL
Qty: 30 TABLET | Refills: 1 | Status: SHIPPED | OUTPATIENT
Start: 2023-11-09 | End: 2024-01-09

## 2023-11-14 ENCOUNTER — TELEPHONE (OUTPATIENT)
Age: 58
End: 2023-11-14

## 2023-11-14 ENCOUNTER — TELEPHONE (OUTPATIENT)
Facility: CLINIC | Age: 58
End: 2023-11-14

## 2023-11-14 DIAGNOSIS — G47.09 OTHER INSOMNIA: ICD-10-CM

## 2023-11-14 RX ORDER — ZOLPIDEM TARTRATE 6.25 MG/1
TABLET, FILM COATED, EXTENDED RELEASE ORAL
Qty: 30 TABLET | Refills: 1 | Status: SHIPPED | OUTPATIENT
Start: 2023-11-14 | End: 2024-01-14

## 2023-11-14 NOTE — TELEPHONE ENCOUNTER
Chief Complaint   Patient presents with    Medication Refill       Requested Prescriptions     Pending Prescriptions Disp Refills    zolpidem (AMBIEN CR) 6.25 MG extended release tablet 30 tablet 1     Sig: TAKE ONE TABLET BY MOUTH AT BEDTIME AS NEEDED FOR SLEEP       Allergies:  No Known Allergies  Last visit with this provider: 8/22/2022   Next Visit with this provider: 12/28/2023    Signed by Anil Renteria CCT  11/14/23  2:59 PM

## 2023-11-14 NOTE — TELEPHONE ENCOUNTER
Medication Refill Request       zolpidem (AMBIEN CR) 6.25 MG extended release tablet      \"Pt called stating his meds were sent to the wrong pharmacy.  Pt would like his meds to be sent to [facility] [address] - [place], [address] [place] [address] [phone]\"

## 2023-11-14 NOTE — TELEPHONE ENCOUNTER
Medication Refill Request         zolpidem (AMBIEN CR) 6.25 MG extended release tablet        \"Pt called stating his meds were sent to the wrong pharmacy.  Pt would like his meds to be sent to [facility] [address], [address] [place] [address] [phone]\"

## 2023-11-14 NOTE — TELEPHONE ENCOUNTER
Pt called stating his meds were sent to the wrong pharmacy.  Pt would like his meds to be sent to [facility] [address] - [place] [address] [facility] [phone]

## 2023-12-05 RX ORDER — POTASSIUM CITRATE 10 MEQ/1
TABLET, EXTENDED RELEASE ORAL
Qty: 90 TABLET | Refills: 0 | OUTPATIENT
Start: 2023-12-05

## 2023-12-05 RX ORDER — FINASTERIDE 1 MG/1
1 TABLET, FILM COATED ORAL DAILY
Qty: 90 TABLET | Refills: 0 | OUTPATIENT
Start: 2023-12-05

## 2023-12-28 ENCOUNTER — OFFICE VISIT (OUTPATIENT)
Age: 58
End: 2023-12-28
Payer: COMMERCIAL

## 2023-12-28 VITALS
SYSTOLIC BLOOD PRESSURE: 118 MMHG | OXYGEN SATURATION: 96 % | RESPIRATION RATE: 14 BRPM | HEART RATE: 65 BPM | TEMPERATURE: 97.4 F | BODY MASS INDEX: 30.75 KG/M2 | WEIGHT: 227 LBS | DIASTOLIC BLOOD PRESSURE: 77 MMHG | HEIGHT: 72 IN

## 2023-12-28 DIAGNOSIS — E78.00 PURE HYPERCHOLESTEROLEMIA: ICD-10-CM

## 2023-12-28 DIAGNOSIS — Z23 FLU VACCINE NEED: ICD-10-CM

## 2023-12-28 DIAGNOSIS — Z12.5 ENCOUNTER FOR PROSTATE CANCER SCREENING: ICD-10-CM

## 2023-12-28 DIAGNOSIS — G25.81 RLS (RESTLESS LEGS SYNDROME): ICD-10-CM

## 2023-12-28 DIAGNOSIS — Z12.5 ENCOUNTER FOR PROSTATE CANCER SCREENING: Primary | ICD-10-CM

## 2023-12-28 PROBLEM — D75.0: Status: ACTIVE | Noted: 2021-05-04

## 2023-12-28 PROBLEM — R27.0 ATAXIA: Status: RESOLVED | Noted: 2022-09-12 | Resolved: 2023-12-28

## 2023-12-28 PROBLEM — G47.00 INSOMNIA: Chronic | Status: ACTIVE | Noted: 2020-07-31

## 2023-12-28 PROBLEM — L65.9 ALOPECIA: Chronic | Status: ACTIVE | Noted: 2020-07-31

## 2023-12-28 PROCEDURE — 99396 PREV VISIT EST AGE 40-64: CPT | Performed by: INTERNAL MEDICINE

## 2023-12-28 PROCEDURE — 90471 IMMUNIZATION ADMIN: CPT | Performed by: INTERNAL MEDICINE

## 2023-12-28 PROCEDURE — 90674 CCIIV4 VAC NO PRSV 0.5 ML IM: CPT | Performed by: INTERNAL MEDICINE

## 2023-12-28 NOTE — PROGRESS NOTES
Pierce Christie is here for complete health maintenance physical exam and screening. he does have other concerns. RLS  Seeing hematology for polycythemia   not p vera ? ? Brings outside lab HB 19 at times getting blood draws  Health maintenance hx includes:  Exercise: moderately active. Form of exercise: pickle ball   Diet: generally follows a low fat low cholesterol diet  unknown occupation  Cancer screening:    Colon cancer screening:  Last Colonoscopy: 2021 and was normal   Prostate cancer screening: PSA and/or CARL:   Lab Results   Component Value Date/Time    PSA 0.5 07/08/2022 10:13 AM          Lab Results   Component Value Date/Time    CHOL 154 09/13/2022 12:40 AM    HDL 34 09/13/2022 12:40 AM    VLDL 26 07/08/2022 10:13 AM       No results found for: \"GLU\", \"GLUCPOC\"      Immunizations:     Immunization History   Administered Date(s) Administered    COVID-19, PFIZER PURPLE top, DILUTE for use, (age 15 y+), 30mcg/0.3mL 04/07/2021, 04/28/2021    Influenza, FLUCELVAX, (age 10 mo+), MDCK, PF, 0.5mL 12/28/2023      Immunization status: missing doses of shingrix.        Social History     Socioeconomic History    Marital status:      Spouse name: Not on file    Number of children: Not on file    Years of education: Not on file    Highest education level: Not on file   Occupational History    Not on file   Tobacco Use    Smoking status: Never    Smokeless tobacco: Never   Substance and Sexual Activity    Alcohol use: Not Currently    Drug use: Never    Sexual activity: Not on file   Other Topics Concern    Not on file   Social History Narrative    Not on file     Social Determinants of Health     Financial Resource Strain: Patient Declined (12/28/2023)    Overall Financial Resource Strain (CARDIA)     Difficulty of Paying Living Expenses: Patient declined   Food Insecurity: Patient Declined (12/28/2023)    Hunger Vital Sign     Worried About Running Out of Food in the Last Year: Patient declined     Ran Out

## 2024-01-05 RX ORDER — POTASSIUM CITRATE 10 MEQ/1
TABLET, EXTENDED RELEASE ORAL
Qty: 90 TABLET | Refills: 1 | Status: SHIPPED | OUTPATIENT
Start: 2024-01-05

## 2024-01-05 RX ORDER — SILDENAFIL 100 MG/1
TABLET, FILM COATED ORAL
Qty: 15 TABLET | Refills: 3 | Status: SHIPPED | OUTPATIENT
Start: 2024-01-05

## 2024-01-09 LAB
ALBUMIN SERPL-MCNC: 4.3 G/DL (ref 3.8–4.9)
ALBUMIN/GLOB SERPL: 1.8 {RATIO} (ref 1.2–2.2)
ALP SERPL-CCNC: 65 IU/L (ref 44–121)
ALT SERPL-CCNC: 32 IU/L (ref 0–44)
AST SERPL-CCNC: 23 IU/L (ref 0–40)
BILIRUB SERPL-MCNC: 1.2 MG/DL (ref 0–1.2)
BUN SERPL-MCNC: 17 MG/DL (ref 8–27)
BUN/CREAT SERPL: 15 (ref 10–24)
CALCIUM SERPL-MCNC: 9.4 MG/DL (ref 8.6–10.2)
CHLORIDE SERPL-SCNC: 101 MMOL/L (ref 96–106)
CHOLEST SERPL-MCNC: 183 MG/DL (ref 100–199)
CO2 SERPL-SCNC: 21 MMOL/L (ref 20–29)
CREAT SERPL-MCNC: 1.17 MG/DL (ref 0.76–1.27)
EGFRCR SERPLBLD CKD-EPI 2021: 71 ML/MIN/1.73
GLOBULIN SER CALC-MCNC: 2.4 G/DL (ref 1.5–4.5)
GLUCOSE SERPL-MCNC: 91 MG/DL (ref 70–99)
HBA1C MFR BLD: 5.3 % (ref 4.8–5.6)
HDLC SERPL-MCNC: 37 MG/DL
IMP & REVIEW OF LAB RESULTS: NORMAL
LDLC SERPL CALC-MCNC: 109 MG/DL (ref 0–99)
POTASSIUM SERPL-SCNC: 5.4 MMOL/L (ref 3.5–5.2)
PROT SERPL-MCNC: 6.7 G/DL (ref 6–8.5)
PSA SERPL-MCNC: 0.3 NG/ML (ref 0–4)
SODIUM SERPL-SCNC: 138 MMOL/L (ref 134–144)
TRIGL SERPL-MCNC: 213 MG/DL (ref 0–149)
VLDLC SERPL CALC-MCNC: 37 MG/DL (ref 5–40)

## 2024-03-19 RX ORDER — FINASTERIDE 1 MG/1
1 TABLET, FILM COATED ORAL DAILY
Qty: 90 TABLET | Refills: 0 | Status: SHIPPED | OUTPATIENT
Start: 2024-03-19

## 2024-05-29 RX ORDER — POTASSIUM CITRATE 10 MEQ/1
TABLET, EXTENDED RELEASE ORAL
Qty: 90 TABLET | Refills: 0 | Status: SHIPPED | OUTPATIENT
Start: 2024-05-29

## 2024-05-30 RX ORDER — FINASTERIDE 1 MG/1
1 TABLET, FILM COATED ORAL DAILY
Qty: 90 TABLET | Refills: 1 | Status: SHIPPED | OUTPATIENT
Start: 2024-05-30

## 2024-06-04 DIAGNOSIS — G47.09 OTHER INSOMNIA: ICD-10-CM

## 2024-06-05 RX ORDER — ZOLPIDEM TARTRATE 6.25 MG/1
TABLET, FILM COATED, EXTENDED RELEASE ORAL
Qty: 30 TABLET | Refills: 0 | OUTPATIENT
Start: 2024-06-05

## 2024-06-07 DIAGNOSIS — F51.01 PRIMARY INSOMNIA: Primary | Chronic | ICD-10-CM

## 2024-06-10 RX ORDER — ZOLPIDEM TARTRATE 6.25 MG/1
6.25 TABLET, FILM COATED, EXTENDED RELEASE ORAL NIGHTLY PRN
Qty: 30 TABLET | Refills: 0 | Status: SHIPPED | OUTPATIENT
Start: 2024-06-10 | End: 2024-07-10

## 2024-06-10 RX ORDER — ZOLPIDEM TARTRATE 6.25 MG/1
6.25 TABLET, FILM COATED, EXTENDED RELEASE ORAL NIGHTLY PRN
COMMUNITY
Start: 2024-03-19 | End: 2024-06-10 | Stop reason: SDUPTHER

## 2024-06-10 NOTE — TELEPHONE ENCOUNTER
Refill request received from [employer] for   Requested Prescriptions     Pending Prescriptions Disp Refills    zolpidem (AMBIEN CR) 6.25 MG extended release tablet       Si tablet nightly as needed for Sleep. Max Daily Amount: 6.25 mg     Last office visit: 2023   Next office visit: Visit date not found     Routed to Dr Colin Mock for review.

## 2024-06-20 ENCOUNTER — OFFICE VISIT (OUTPATIENT)
Age: 59
End: 2024-06-20
Payer: COMMERCIAL

## 2024-06-20 VITALS
DIASTOLIC BLOOD PRESSURE: 83 MMHG | TEMPERATURE: 96.9 F | RESPIRATION RATE: 18 BRPM | HEART RATE: 58 BPM | OXYGEN SATURATION: 97 % | SYSTOLIC BLOOD PRESSURE: 124 MMHG | WEIGHT: 222.2 LBS | HEIGHT: 72 IN | BODY MASS INDEX: 30.1 KG/M2

## 2024-06-20 DIAGNOSIS — M77.11 LATERAL EPICONDYLITIS OF RIGHT ELBOW: Primary | ICD-10-CM

## 2024-06-20 DIAGNOSIS — L98.9 SKIN LESION OF LOWER EXTREMITY: ICD-10-CM

## 2024-06-20 PROCEDURE — 99213 OFFICE O/P EST LOW 20 MIN: CPT | Performed by: INTERNAL MEDICINE

## 2024-06-20 ASSESSMENT — PATIENT HEALTH QUESTIONNAIRE - PHQ9
SUM OF ALL RESPONSES TO PHQ QUESTIONS 1-9: 0
SUM OF ALL RESPONSES TO PHQ9 QUESTIONS 1 & 2: 0
1. LITTLE INTEREST OR PLEASURE IN DOING THINGS: NOT AT ALL
SUM OF ALL RESPONSES TO PHQ QUESTIONS 1-9: 0
2. FEELING DOWN, DEPRESSED OR HOPELESS: NOT AT ALL

## 2024-06-20 NOTE — PROGRESS NOTES
I have reviewed all needed documentation in preparation for visit. Verified patient by name and date of birth  Chief Complaint   Patient presents with    Elbow Injury     Right elbow hurts- Cortizone shot- pain stared a few months ago- bump on right foot-        Vitals:    06/20/24 1343   BP: 124/83   Site: Right Upper Arm   Position: Sitting   Cuff Size: Medium Adult   Pulse: 58   Resp: 18   Temp: 96.9 °F (36.1 °C)   TempSrc: Temporal   SpO2: 97%   Weight: 100.8 kg (222 lb 3.2 oz)   Height: 1.829 m (6')       Health Maintenance Due   Topic Date Due    HIV screen  Never done    DTaP/Tdap/Td vaccine (1 - Tdap) Never done    Shingles vaccine (1 of 2) Never done    Respiratory Syncytial Virus (RSV) Pregnant or age 60 yrs+ (1 - 1-dose 60+ series) Never done    COVID-19 Vaccine (3 - 2023-24 season) 09/01/2023     \"Have you been to the ER, urgent care clinic since your last visit?  Hospitalized since your last visit?\"    NO    “Have you seen or consulted any other health care providers outside of [facility] since your last visit?”    NO            Click Here for Release of Records Request         Theresa parra CMA

## 2024-08-15 DIAGNOSIS — F51.01 PRIMARY INSOMNIA: Chronic | ICD-10-CM

## 2024-08-16 ENCOUNTER — TELEPHONE (OUTPATIENT)
Age: 59
End: 2024-08-16

## 2024-08-16 RX ORDER — ZOLPIDEM TARTRATE 6.25 MG/1
TABLET, FILM COATED, EXTENDED RELEASE ORAL
Qty: 30 TABLET | Refills: 0 | Status: SHIPPED | OUTPATIENT
Start: 2024-08-16 | End: 2024-09-15

## 2024-08-16 NOTE — TELEPHONE ENCOUNTER
Spoke with patient's wife. Offered to schedule VV for Tuesday 08/20. She states Paxlovid is time sensitive and Tuesday would be too late to start the medication. I told her that in order for her  to be prescribed any medication, he would need an appointment and none of the other providers at [facility] offer Paxlovid for COVID. I also told her that I could not guarantee that Dr. Mock would write the prescription either. I informed her that since Paxlovid is time sensitive and Dr. Mock won't be in the office until Tuesday, her  should visit urgent care for the medication instead. She agreed.

## 2024-08-16 NOTE — TELEPHONE ENCOUNTER
Pt just tested positive for Covid and started having symptoms last night. He is requesting a script for Paxlovid. He would like a call back as soon as possible on this matter.

## 2024-08-20 ENCOUNTER — OFFICE VISIT (OUTPATIENT)
Age: 59
End: 2024-08-20
Payer: COMMERCIAL

## 2024-08-20 VITALS
SYSTOLIC BLOOD PRESSURE: 141 MMHG | WEIGHT: 219.6 LBS | HEART RATE: 62 BPM | HEIGHT: 72 IN | BODY MASS INDEX: 29.74 KG/M2 | OXYGEN SATURATION: 94 % | TEMPERATURE: 97.6 F | RESPIRATION RATE: 16 BRPM | DIASTOLIC BLOOD PRESSURE: 91 MMHG

## 2024-08-20 DIAGNOSIS — S39.012A LOW BACK STRAIN, INITIAL ENCOUNTER: Primary | ICD-10-CM

## 2024-08-20 PROCEDURE — 99213 OFFICE O/P EST LOW 20 MIN: CPT | Performed by: INTERNAL MEDICINE

## 2024-08-20 NOTE — PROGRESS NOTES
I have reviewed all needed documentation in preparation for visit. Verified patient by name and date of birth  Chief Complaint   Patient presents with    Lower Back Pain     X1 week        Vitals:    08/20/24 1407   BP: (!) 141/91   Site: Left Upper Arm   Position: Sitting   Cuff Size: Medium Adult   Pulse: 62   Resp: 16   Temp: 97.6 °F (36.4 °C)   TempSrc: Temporal   SpO2: 94%   Weight: 99.6 kg (219 lb 9.6 oz)   Height: 1.829 m (6')       Health Maintenance Due   Topic Date Due    HIV screen  Never done    DTaP/Tdap/Td vaccine (1 - Tdap) Never done    Shingles vaccine (1 of 2) Never done    Respiratory Syncytial Virus (RSV) Pregnant or age 60 yrs+ (1 - 1-dose 60+ series) Never done    COVID-19 Vaccine (3 - 2023-24 season) 09/01/2023    Flu vaccine (1) 08/01/2024     \"Have you been to the ER, urgent care clinic since your last visit?  Hospitalized since your last visit?\"    NO    “Have you seen or consulted any other health care providers outside of [facility] since your last visit?”    NO            Click Here for Release of Records Request         ELIZABETH Medellin

## 2024-08-20 NOTE — PROGRESS NOTES
SUBJECTIVE:   Jacob Green is a 59 y.o. male who complains of low back pain for 7 day(s), positional with bending or lifting, without radiation down the legs. Precipitating factors: recent heavy lifting. Prior history of back problems: no prior back problems. There is no numbness in the legs.LIFTED A SNOW B,LOWER OUT OF A TRUCK    OBJECTIVE:  BP (!) 141/91 (Site: Left Upper Arm, Position: Sitting, Cuff Size: Medium Adult)   Pulse 62   Temp 97.6 °F (36.4 °C) (Temporal)   Resp 16   Ht 1.829 m (6')   Wt 99.6 kg (219 lb 9.6 oz)   SpO2 94%   BMI 29.78 kg/m²    Patient appears to be in mild to moderate pain, antalgic gait noted. Lumbosacral spine area reveals no local tenderness or mass.  Painful and reduced LS ROM noted. Straight leg raise is negative at 45 degrees on both sides. DTR's, motor strength and sensation normal, including heel and toe gait.  Peripheral pulses are palpable. X-Ray: not indicated.    ASSESSMENT:   lumbar strain    PLAN:  For acute pain, rest, intermittent application of heat (do not sleep on heating pad), analgesics and muscle relaxants are recommended. Discussed longer term treatment plan of prn NSAID's and discussed a home back care exercise program with flexion exercise routine. Proper lifting with avoidance of heavy lifting discussed. Consider Physical Therapy and XRay studies if not improving. Call or return to clinic prn if these symptoms worsen or fail to improve as anticipated.  Jacob was seen today for lower back pain.    Diagnoses and all orders for this visit:    Low back strain, initial encounter  -     External Referral To Physical Therapy      iBUPROFEN [id number]    ICE   STRETCHING

## 2024-09-06 NOTE — TELEPHONE ENCOUNTER
Refill request received from [facility]    for   Requested Prescriptions     Pending Prescriptions Disp Refills    potassium citrate (UROCIT-K) 10 MEQ (1080 MG) extended release tablet [Pharmacy Med Name: Potassium Citrate ER Oral Tablet Extended Release 10 MEQ (1080 MG)] 90 tablet 0     Sig: TAKE 1 TABLET BY MOUTH ONE TIME DAILY     Last office visit: 8/20/2024   Next office visit: Visit date not found     Routed to VITOR Santiago for review.     Winnie Soto LPN

## 2024-09-09 ENCOUNTER — PATIENT MESSAGE (OUTPATIENT)
Age: 59
End: 2024-09-09

## 2024-09-09 RX ORDER — POTASSIUM CITRATE 10 MEQ/1
TABLET, EXTENDED RELEASE ORAL
Qty: 90 TABLET | Refills: 0 | OUTPATIENT
Start: 2024-09-09

## 2024-09-09 RX ORDER — POTASSIUM CITRATE 10 MEQ/1
TABLET, EXTENDED RELEASE ORAL
Qty: 78 TABLET | Refills: 1 | Status: SHIPPED | OUTPATIENT
Start: 2024-09-09

## 2024-09-26 ENCOUNTER — PATIENT MESSAGE (OUTPATIENT)
Age: 59
End: 2024-09-26

## 2024-09-26 DIAGNOSIS — S39.012A LOW BACK STRAIN, INITIAL ENCOUNTER: Primary | ICD-10-CM

## 2024-09-26 DIAGNOSIS — M53.86 SCIATICA ASSOCIATED WITH DISORDER OF LUMBAR SPINE: ICD-10-CM

## 2024-10-10 DIAGNOSIS — F51.01 PRIMARY INSOMNIA: Chronic | ICD-10-CM

## 2024-10-11 RX ORDER — SILDENAFIL 100 MG/1
TABLET, FILM COATED ORAL
Qty: 30 TABLET | Refills: 1 | Status: SHIPPED | OUTPATIENT
Start: 2024-10-11

## 2024-10-11 RX ORDER — ZOLPIDEM TARTRATE 6.25 MG/1
TABLET, FILM COATED, EXTENDED RELEASE ORAL
Qty: 30 TABLET | Refills: 0 | Status: SHIPPED | OUTPATIENT
Start: 2024-10-11 | End: 2024-11-11

## 2024-10-22 ENCOUNTER — PATIENT MESSAGE (OUTPATIENT)
Age: 59
End: 2024-10-22

## 2024-10-22 RX ORDER — METHYLPREDNISOLONE 4 MG/1
TABLET ORAL
Qty: 1 KIT | Refills: 0 | Status: SHIPPED | OUTPATIENT
Start: 2024-10-22 | End: 2024-10-28

## 2024-11-25 DIAGNOSIS — F51.01 PRIMARY INSOMNIA: Chronic | ICD-10-CM

## 2024-11-25 RX ORDER — ZOLPIDEM TARTRATE 6.25 MG/1
TABLET, FILM COATED, EXTENDED RELEASE ORAL
Qty: 30 TABLET | Refills: 0 | Status: SHIPPED | OUTPATIENT
Start: 2024-11-25 | End: 2024-12-25

## 2024-11-25 RX ORDER — SILDENAFIL 100 MG/1
TABLET, FILM COATED ORAL
Qty: 30 TABLET | Refills: 0 | Status: SHIPPED | OUTPATIENT
Start: 2024-11-25

## 2025-01-02 DIAGNOSIS — F51.01 PRIMARY INSOMNIA: Chronic | ICD-10-CM

## 2025-01-03 RX ORDER — ZOLPIDEM TARTRATE 6.25 MG/1
TABLET, FILM COATED, EXTENDED RELEASE ORAL
Qty: 30 TABLET | Refills: 0 | OUTPATIENT
Start: 2025-01-03 | End: 2025-02-02

## 2025-02-24 ENCOUNTER — OFFICE VISIT (OUTPATIENT)
Age: 60
End: 2025-02-24
Payer: COMMERCIAL

## 2025-02-24 VITALS
SYSTOLIC BLOOD PRESSURE: 117 MMHG | RESPIRATION RATE: 20 BRPM | HEART RATE: 94 BPM | WEIGHT: 227.8 LBS | DIASTOLIC BLOOD PRESSURE: 83 MMHG | BODY MASS INDEX: 30.9 KG/M2 | TEMPERATURE: 98.2 F | OXYGEN SATURATION: 97 %

## 2025-02-24 DIAGNOSIS — R05.8 POST-VIRAL COUGH SYNDROME: Primary | ICD-10-CM

## 2025-02-24 DIAGNOSIS — J06.9 VIRAL UPPER RESPIRATORY TRACT INFECTION: ICD-10-CM

## 2025-02-24 PROCEDURE — 99213 OFFICE O/P EST LOW 20 MIN: CPT | Performed by: NURSE PRACTITIONER

## 2025-02-24 RX ORDER — ZOLPIDEM TARTRATE 6.25 MG/1
6.25 TABLET, FILM COATED, EXTENDED RELEASE ORAL NIGHTLY PRN
COMMUNITY

## 2025-02-24 RX ORDER — DEXTROMETHORPHAN HYDROBROMIDE AND PROMETHAZINE HYDROCHLORIDE 15; 6.25 MG/5ML; MG/5ML
5 SYRUP ORAL 4 TIMES DAILY PRN
Qty: 240 ML | Refills: 0 | Status: SHIPPED | OUTPATIENT
Start: 2025-02-24 | End: 2025-03-03

## 2025-02-24 RX ORDER — AZITHROMYCIN 250 MG/1
TABLET, FILM COATED ORAL
Qty: 6 TABLET | Refills: 0 | Status: SHIPPED | OUTPATIENT
Start: 2025-02-24 | End: 2025-03-06

## 2025-02-24 SDOH — ECONOMIC STABILITY: FOOD INSECURITY: WITHIN THE PAST 12 MONTHS, THE FOOD YOU BOUGHT JUST DIDN'T LAST AND YOU DIDN'T HAVE MONEY TO GET MORE.: NEVER TRUE

## 2025-02-24 SDOH — ECONOMIC STABILITY: FOOD INSECURITY: WITHIN THE PAST 12 MONTHS, YOU WORRIED THAT YOUR FOOD WOULD RUN OUT BEFORE YOU GOT MONEY TO BUY MORE.: NEVER TRUE

## 2025-02-24 ASSESSMENT — PATIENT HEALTH QUESTIONNAIRE - PHQ9
1. LITTLE INTEREST OR PLEASURE IN DOING THINGS: NOT AT ALL
SUM OF ALL RESPONSES TO PHQ QUESTIONS 1-9: 0
SUM OF ALL RESPONSES TO PHQ QUESTIONS 1-9: 0
SUM OF ALL RESPONSES TO PHQ9 QUESTIONS 1 & 2: 0
SUM OF ALL RESPONSES TO PHQ QUESTIONS 1-9: 0
SUM OF ALL RESPONSES TO PHQ QUESTIONS 1-9: 0
2. FEELING DOWN, DEPRESSED OR HOPELESS: NOT AT ALL

## 2025-02-24 ASSESSMENT — ENCOUNTER SYMPTOMS
RHINORRHEA: 1
SINUS PAIN: 0
SORE THROAT: 0
TROUBLE SWALLOWING: 0
WHEEZING: 0
COUGH: 1
CHEST TIGHTNESS: 1
SHORTNESS OF BREATH: 0

## 2025-02-24 NOTE — PROGRESS NOTES
Jacob Green is a 60 y.o. male  Chief Complaint   Patient presents with    Cold Symptoms     For about 3 weeks, headaches  Went to urgent care 2 weeks ago, not taking their prescribed cough med or steroids       Vitals:    02/24/25 1335   BP: 117/83   Pulse: 94   Resp: 20   Temp: 98.2 °F (36.8 °C)   SpO2: 97%      Wt Readings from Last 3 Encounters:   02/24/25 103.3 kg (227 lb 12.8 oz)   08/20/24 99.6 kg (219 lb 9.6 oz)   06/20/24 100.8 kg (222 lb 3.2 oz)     BMI Readings from Last 3 Encounters:   02/24/25 30.90 kg/m²   08/20/24 29.78 kg/m²   06/20/24 30.14 kg/m²     Health Maintenance Due   Topic Date Due    HIV screen  Never done    DTaP/Tdap/Td vaccine (1 - Tdap) Never done    Shingles vaccine (1 of 2) Never done    Pneumococcal 50+ years Vaccine (1 of 1 - PCV) Never done    Flu vaccine (1) 08/01/2024    COVID-19 Vaccine (3 - 2024-25 season) 09/01/2024     HPI  Patient of Dr. Glover is here for an acute visit.  Complaints of upper respiratory infection lasting 3 weeks.  He has headache sinus pressure and congestion.    He reports going to urgent care 2 weeks ago . Dx  URI.  Was prescribed cough medicine and steroid which she did not take.   He was not prescribed a antibiotic.     + Cough Worse at night.     Current symptoms include  cough. \" Green\" Some PND  He denies fever, shortness of breath or wheezing.     Current treatment includes none.   tylenol  No asthma/ non smoker.     Negative COVID test  ROS  Review of Systems   Constitutional:  Negative for fever.   HENT:  Positive for rhinorrhea. Negative for congestion, ear pain, postnasal drip, sinus pain, sore throat and trouble swallowing.    Respiratory:  Positive for cough and chest tightness. Negative for shortness of breath and wheezing.    Skin:  Negative for rash.   Allergic/Immunologic: Negative for environmental allergies.   Neurological:  Positive for headaches.   Hematological:  Negative for adenopathy.   Psychiatric/Behavioral:  Positive for sleep

## 2025-02-24 NOTE — PATIENT INSTRUCTIONS
. The goal is to treat the cough,  congestion and runny nose. This can be done by using nasal sprays and over the counter medications such as guaifenesin or dextromethorphan for cough, pseudoephedrine for congestion and tylenol or ibuprofen for pain.     Do not mix  multiple over the counter medications without reading the label first.  Avoid dairy products as they can thicken mucous.  Cool mist humidifier in the bedroom.  Drink lots of water and rest.

## 2025-02-24 NOTE — PROGRESS NOTES
I have reviewed all needed documentation in preparation for visit. Verified patient by name and date of birth    Chief Complaint   Patient presents with    Cold Symptoms     For about 3 weeks, headaches  Went to urgent care 2 weeks ago, not taking their prescribed cough med or steroids       \"Have you been to the ER, urgent care clinic since your last visit?  Hospitalized since your last visit?\"    YES - When: approximately 2  weeks ago.  Where and Why: 2 weeks ago, in [place]. Does not remember name of practice.    “Have you seen or consulted any other health care providers outside our system since your last visit?”    NO             Vitals:    02/24/25 1335   BP: 117/83   Site: Right Upper Arm   Position: Sitting   Cuff Size: Medium Adult   Pulse: 94   Resp: 20   Temp: 98.2 °F (36.8 °C)   TempSrc: Temporal   SpO2: 97%   Weight: 103.3 kg (227 lb 12.8 oz)       Health Maintenance Due   Topic Date Due    HIV screen  Never done    DTaP/Tdap/Td vaccine (1 - Tdap) Never done    Shingles vaccine (1 of 2) Never done    Pneumococcal 50+ years Vaccine (1 of 1 - PCV) Never done    Flu vaccine (1) 08/01/2024    COVID-19 Vaccine (3 - 2024-25 season) 09/01/2024       Winnie Soto LPN

## 2025-03-04 ENCOUNTER — OFFICE VISIT (OUTPATIENT)
Age: 60
End: 2025-03-04

## 2025-03-04 VITALS
HEIGHT: 72 IN | HEART RATE: 78 BPM | BODY MASS INDEX: 30.69 KG/M2 | OXYGEN SATURATION: 95 % | TEMPERATURE: 97.7 F | SYSTOLIC BLOOD PRESSURE: 114 MMHG | WEIGHT: 226.6 LBS | RESPIRATION RATE: 18 BRPM | DIASTOLIC BLOOD PRESSURE: 79 MMHG

## 2025-03-04 DIAGNOSIS — G47.09 OTHER INSOMNIA: ICD-10-CM

## 2025-03-04 DIAGNOSIS — Z00.00 GENERAL MEDICAL EXAMINATION: ICD-10-CM

## 2025-03-04 DIAGNOSIS — Z00.00 ENCOUNTER FOR WELL ADULT EXAM WITHOUT ABNORMAL FINDINGS: ICD-10-CM

## 2025-03-04 DIAGNOSIS — G11.9 HEREDITARY ATAXIA, UNSPECIFIED (HCC): ICD-10-CM

## 2025-03-04 DIAGNOSIS — G25.81 RLS (RESTLESS LEGS SYNDROME): ICD-10-CM

## 2025-03-04 DIAGNOSIS — D75.0 POLYCYTHEMIA, FAMILIAL: Primary | ICD-10-CM

## 2025-03-04 RX ORDER — ZOLPIDEM TARTRATE 6.25 MG/1
6.25 TABLET, FILM COATED, EXTENDED RELEASE ORAL NIGHTLY PRN
Qty: 60 TABLET | Refills: 2 | Status: SHIPPED | OUTPATIENT
Start: 2025-03-04 | End: 2025-08-31

## 2025-03-04 RX ORDER — SILDENAFIL 100 MG/1
100 TABLET, FILM COATED ORAL DAILY PRN
Qty: 30 TABLET | Refills: 2 | Status: SHIPPED | OUTPATIENT
Start: 2025-03-04

## 2025-03-05 NOTE — PATIENT INSTRUCTIONS
hands, brush your teeth twice a day, and wear a seat belt in the car.   Where can you learn more?  Go to [email or web address] and enter P072 to learn more about \"Well Visit, Ages 18 to 65: Care Instructions.\"  Current as of: April 30, 2024  Content Version: 14.3  © 2024 [employer].   Care instructions adapted under license by [employer]. If you have questions about a medical condition or this instruction, always ask your healthcare professional. [employer] disclaims any warranty or liability for your use of this information.

## 2025-03-05 NOTE — PROGRESS NOTES
I have reviewed all needed documentation in preparation for visit. Verified patient by name and date of birth  Chief Complaint   Patient presents with    Annual Exam       Vitals:    03/04/25 1308   BP: 114/79   Site: Left Upper Arm   Position: Sitting   Cuff Size: Large Adult   Pulse: 78   Resp: 18   Temp: 97.7 °F (36.5 °C)   TempSrc: Temporal   SpO2: 95%   Weight: 102.8 kg (226 lb 9.6 oz)   Height: 1.829 m (6')       Health Maintenance Due   Topic Date Due    HIV screen  Never done    DTaP/Tdap/Td vaccine (1 - Tdap) Never done    Shingles vaccine (1 of 2) Never done    Pneumococcal 50+ years Vaccine (1 of 1 - PCV) Never done    Flu vaccine (1) 08/01/2024    COVID-19 Vaccine (3 - 2024-25 season) 09/01/2024     \"Have you been to the ER, urgent care clinic since your last visit?  Hospitalized since your last visit?\"    NO    “Have you seen or consulted any other health care providers outside of [facility] since your last visit?”    NO            Click Here for Release of Records Request         ELIZABETH Medellin  
sleep disturbance  Endocrine ROS: negative for - hair pattern changes, hot flashes, malaise/lethargy, palpitations, polydipsia/polyuria, temperature intolerance or unexpected weight changes    No Known Allergies  Prior to Visit Medications    Medication Sig Taking? Authorizing Provider   zolpidem (AMBIEN CR) 6.25 MG extended release tablet Take 1 tablet by mouth nightly as needed for Sleep for up to 180 days. Max Daily Amount: 6.25 mg Yes Colin Mock MD   sildenafil (VIAGRA) 100 MG tablet Take 1 tablet by mouth daily as needed for Erectile Dysfunction Yes Colin Mock MD   potassium citrate (UROCIT-K) 10 MEQ (1080 MG) extended release tablet One po daily 6 days of the week, skip Sundays  Patient taking differently: Take 1 tablet by mouth every other day Yes Colin Mock MD   finasteride (PROPECIA) 1 MG tablet TAKE 1 TABLET BY MOUTH ONE TIME DAILY  Patient taking differently: Take 1 tablet by mouth every other day Yes Colin Mock MD   acetaminophen (TYLENOL) 500 MG tablet Take by mouth as needed Yes Automatic Reconciliation, Ar     History reviewed. No pertinent past medical history.  History reviewed. No pertinent surgical history.  History reviewed. No pertinent family history.  Social History     Tobacco Use    Smoking status: Never    Smokeless tobacco: Never   Substance Use Topics    Alcohol use: Not Currently    Drug use: Never           Objective    Vital Signs  /79 (Site: Left Upper Arm, Position: Sitting, Cuff Size: Large Adult)   Pulse 78   Temp 97.7 °F (36.5 °C) (Temporal)   Resp 18   Ht 1.829 m (6')   Wt 102.8 kg (226 lb 9.6 oz)   SpO2 95%   BMI 30.73 kg/m²     Wt Readings from Last 3 Encounters:   03/04/25 102.8 kg (226 lb 9.6 oz)   02/24/25 103.3 kg (227 lb 12.8 oz)   08/20/24 99.6 kg (219 lb 9.6 oz)       Physical Exam  Vitals:    03/04/25 1308   BP: 114/79   Site: Left Upper Arm   Position: Sitting   Cuff Size: Large Adult   Pulse: 78   Resp: 18   Temp: 97.7 °F